# Patient Record
Sex: MALE | Race: WHITE | NOT HISPANIC OR LATINO | Employment: FULL TIME | URBAN - METROPOLITAN AREA
[De-identification: names, ages, dates, MRNs, and addresses within clinical notes are randomized per-mention and may not be internally consistent; named-entity substitution may affect disease eponyms.]

---

## 2022-07-03 ENCOUNTER — HOSPITAL ENCOUNTER (INPATIENT)
Facility: CLINIC | Age: 47
LOS: 4 days | Discharge: HOME | DRG: 418 | End: 2022-07-07
Attending: HOSPITALIST | Admitting: HOSPITALIST
Payer: COMMERCIAL

## 2022-07-03 ENCOUNTER — APPOINTMENT (OUTPATIENT)
Dept: ULTRASOUND IMAGING | Facility: CLINIC | Age: 47
End: 2022-07-03
Attending: EMERGENCY MEDICINE
Payer: COMMERCIAL

## 2022-07-03 ENCOUNTER — HOSPITAL ENCOUNTER (EMERGENCY)
Facility: CLINIC | Age: 47
Discharge: ANOTHER HOSPITAL | End: 2022-07-03
Attending: EMERGENCY MEDICINE | Admitting: EMERGENCY MEDICINE
Payer: COMMERCIAL

## 2022-07-03 VITALS
RESPIRATION RATE: 18 BRPM | BODY MASS INDEX: 44.1 KG/M2 | SYSTOLIC BLOOD PRESSURE: 109 MMHG | OXYGEN SATURATION: 97 % | DIASTOLIC BLOOD PRESSURE: 77 MMHG | HEART RATE: 85 BPM | WEIGHT: 315 LBS | HEIGHT: 71 IN | TEMPERATURE: 97.6 F

## 2022-07-03 DIAGNOSIS — G89.18 ACUTE POST-OPERATIVE PAIN: ICD-10-CM

## 2022-07-03 DIAGNOSIS — R79.89 ELEVATED LIVER FUNCTION TESTS: Primary | ICD-10-CM

## 2022-07-03 DIAGNOSIS — K81.9 CHOLECYSTITIS: ICD-10-CM

## 2022-07-03 LAB
ALBUMIN SERPL-MCNC: 3 G/DL (ref 3.4–5)
ALBUMIN UR-MCNC: 30 MG/DL
ALP SERPL-CCNC: 314 U/L (ref 40–150)
ALT SERPL W P-5'-P-CCNC: 159 U/L (ref 0–70)
ANION GAP SERPL CALCULATED.3IONS-SCNC: 10 MMOL/L (ref 3–14)
ANION GAP SERPL CALCULATED.3IONS-SCNC: 12 MMOL/L (ref 3–14)
APPEARANCE UR: ABNORMAL
AST SERPL W P-5'-P-CCNC: 107 U/L (ref 0–45)
BACTERIA #/AREA URNS HPF: ABNORMAL /HPF
BASOPHILS # BLD AUTO: 0 10E3/UL (ref 0–0.2)
BASOPHILS NFR BLD AUTO: 0 %
BILIRUB DIRECT SERPL-MCNC: 5.3 MG/DL (ref 0–0.2)
BILIRUB SERPL-MCNC: 7.3 MG/DL (ref 0.2–1.3)
BILIRUB UR QL STRIP: ABNORMAL
BUN SERPL-MCNC: 20 MG/DL (ref 7–30)
BUN SERPL-MCNC: 23 MG/DL (ref 7–30)
CALCIUM SERPL-MCNC: 9.1 MG/DL (ref 8.5–10.1)
CALCIUM SERPL-MCNC: 9.9 MG/DL (ref 8.5–10.1)
CHLORIDE BLD-SCNC: 104 MMOL/L (ref 94–109)
CHLORIDE BLD-SCNC: 95 MMOL/L (ref 94–109)
CO2 SERPL-SCNC: 23 MMOL/L (ref 20–32)
CO2 SERPL-SCNC: 25 MMOL/L (ref 20–32)
COLOR UR AUTO: ABNORMAL
CREAT SERPL-MCNC: 1.02 MG/DL (ref 0.66–1.25)
CREAT SERPL-MCNC: 1.47 MG/DL (ref 0.66–1.25)
EOSINOPHIL # BLD AUTO: 0.2 10E3/UL (ref 0–0.7)
EOSINOPHIL NFR BLD AUTO: 2 %
ERYTHROCYTE [DISTWIDTH] IN BLOOD BY AUTOMATED COUNT: 12.9 % (ref 10–15)
FLUAV RNA SPEC QL NAA+PROBE: NEGATIVE
FLUBV RNA RESP QL NAA+PROBE: NEGATIVE
GFR SERPL CREATININE-BSD FRML MDRD: 58 ML/MIN/1.73M2
GFR SERPL CREATININE-BSD FRML MDRD: 90 ML/MIN/1.73M2
GLUCOSE BLD-MCNC: 163 MG/DL (ref 70–99)
GLUCOSE BLD-MCNC: 215 MG/DL (ref 70–99)
GLUCOSE UR STRIP-MCNC: 50 MG/DL
HCT VFR BLD AUTO: 42.8 % (ref 40–53)
HGB BLD-MCNC: 15.3 G/DL (ref 13.3–17.7)
HGB UR QL STRIP: ABNORMAL
HYALINE CASTS: 7 /LPF
IMM GRANULOCYTES # BLD: 0 10E3/UL
IMM GRANULOCYTES NFR BLD: 1 %
INR PPP: 1.23 (ref 0.85–1.15)
KETONES UR STRIP-MCNC: NEGATIVE MG/DL
LACTATE SERPL-SCNC: 1.5 MMOL/L (ref 0.7–2)
LEUKOCYTE ESTERASE UR QL STRIP: NEGATIVE
LIPASE SERPL-CCNC: 103 U/L (ref 73–393)
LYMPHOCYTES # BLD AUTO: 0.8 10E3/UL (ref 0.8–5.3)
LYMPHOCYTES NFR BLD AUTO: 10 %
MCH RBC QN AUTO: 28.5 PG (ref 26.5–33)
MCHC RBC AUTO-ENTMCNC: 35.7 G/DL (ref 31.5–36.5)
MCV RBC AUTO: 80 FL (ref 78–100)
MONOCYTES # BLD AUTO: 1.1 10E3/UL (ref 0–1.3)
MONOCYTES NFR BLD AUTO: 13 %
MUCOUS THREADS #/AREA URNS LPF: PRESENT /LPF
NEUTROPHILS # BLD AUTO: 6 10E3/UL (ref 1.6–8.3)
NEUTROPHILS NFR BLD AUTO: 74 %
NITRATE UR QL: NEGATIVE
NRBC # BLD AUTO: 0 10E3/UL
NRBC BLD AUTO-RTO: 0 /100
PH UR STRIP: 5 [PH] (ref 5–7)
PLATELET # BLD AUTO: 234 10E3/UL (ref 150–450)
POTASSIUM BLD-SCNC: 3.1 MMOL/L (ref 3.4–5.3)
POTASSIUM BLD-SCNC: 3.1 MMOL/L (ref 3.4–5.3)
PROT SERPL-MCNC: 7.6 G/DL (ref 6.8–8.8)
RBC # BLD AUTO: 5.36 10E6/UL (ref 4.4–5.9)
RBC URINE: 1 /HPF
RENAL TUB EPI: 2 /HPF
SARS-COV-2 RNA RESP QL NAA+PROBE: NEGATIVE
SODIUM SERPL-SCNC: 132 MMOL/L (ref 133–144)
SODIUM SERPL-SCNC: 137 MMOL/L (ref 133–144)
SP GR UR STRIP: 1.02 (ref 1–1.03)
SQUAMOUS EPITHELIAL: 1 /HPF
TROPONIN I SERPL HS-MCNC: 16 NG/L
UROBILINOGEN UR STRIP-MCNC: 4 MG/DL
WBC # BLD AUTO: 8.2 10E3/UL (ref 4–11)
WBC CAST: 14 /LPF
WBC URINE: 67 /HPF

## 2022-07-03 PROCEDURE — 83690 ASSAY OF LIPASE: CPT | Performed by: EMERGENCY MEDICINE

## 2022-07-03 PROCEDURE — 258N000003 HC RX IP 258 OP 636: Performed by: EMERGENCY MEDICINE

## 2022-07-03 PROCEDURE — 250N000011 HC RX IP 250 OP 636: Performed by: HOSPITALIST

## 2022-07-03 PROCEDURE — 87636 SARSCOV2 & INF A&B AMP PRB: CPT | Performed by: EMERGENCY MEDICINE

## 2022-07-03 PROCEDURE — 87086 URINE CULTURE/COLONY COUNT: CPT | Performed by: EMERGENCY MEDICINE

## 2022-07-03 PROCEDURE — 85025 COMPLETE CBC W/AUTO DIFF WBC: CPT | Performed by: EMERGENCY MEDICINE

## 2022-07-03 PROCEDURE — 99223 1ST HOSP IP/OBS HIGH 75: CPT | Mod: AI | Performed by: HOSPITALIST

## 2022-07-03 PROCEDURE — 84484 ASSAY OF TROPONIN QUANT: CPT | Performed by: EMERGENCY MEDICINE

## 2022-07-03 PROCEDURE — 93005 ELECTROCARDIOGRAM TRACING: CPT | Performed by: EMERGENCY MEDICINE

## 2022-07-03 PROCEDURE — 120N000001 HC R&B MED SURG/OB

## 2022-07-03 PROCEDURE — 99285 EMERGENCY DEPT VISIT HI MDM: CPT | Mod: 25 | Performed by: EMERGENCY MEDICINE

## 2022-07-03 PROCEDURE — 76705 ECHO EXAM OF ABDOMEN: CPT

## 2022-07-03 PROCEDURE — 96361 HYDRATE IV INFUSION ADD-ON: CPT | Performed by: EMERGENCY MEDICINE

## 2022-07-03 PROCEDURE — 83605 ASSAY OF LACTIC ACID: CPT | Performed by: EMERGENCY MEDICINE

## 2022-07-03 PROCEDURE — 258N000003 HC RX IP 258 OP 636: Performed by: HOSPITALIST

## 2022-07-03 PROCEDURE — C9803 HOPD COVID-19 SPEC COLLECT: HCPCS | Performed by: EMERGENCY MEDICINE

## 2022-07-03 PROCEDURE — 96375 TX/PRO/DX INJ NEW DRUG ADDON: CPT | Performed by: EMERGENCY MEDICINE

## 2022-07-03 PROCEDURE — 81001 URINALYSIS AUTO W/SCOPE: CPT | Performed by: EMERGENCY MEDICINE

## 2022-07-03 PROCEDURE — C9113 INJ PANTOPRAZOLE SODIUM, VIA: HCPCS | Performed by: EMERGENCY MEDICINE

## 2022-07-03 PROCEDURE — 250N000009 HC RX 250: Performed by: EMERGENCY MEDICINE

## 2022-07-03 PROCEDURE — 36415 COLL VENOUS BLD VENIPUNCTURE: CPT | Performed by: EMERGENCY MEDICINE

## 2022-07-03 PROCEDURE — 250N000013 HC RX MED GY IP 250 OP 250 PS 637: Performed by: EMERGENCY MEDICINE

## 2022-07-03 PROCEDURE — 36415 COLL VENOUS BLD VENIPUNCTURE: CPT | Performed by: HOSPITALIST

## 2022-07-03 PROCEDURE — 82248 BILIRUBIN DIRECT: CPT | Performed by: EMERGENCY MEDICINE

## 2022-07-03 PROCEDURE — 85610 PROTHROMBIN TIME: CPT | Performed by: EMERGENCY MEDICINE

## 2022-07-03 PROCEDURE — 96365 THER/PROPH/DIAG IV INF INIT: CPT | Performed by: EMERGENCY MEDICINE

## 2022-07-03 PROCEDURE — 250N000011 HC RX IP 250 OP 636: Performed by: EMERGENCY MEDICINE

## 2022-07-03 PROCEDURE — 82310 ASSAY OF CALCIUM: CPT | Performed by: HOSPITALIST

## 2022-07-03 PROCEDURE — 93010 ELECTROCARDIOGRAM REPORT: CPT | Performed by: EMERGENCY MEDICINE

## 2022-07-03 PROCEDURE — 80053 COMPREHEN METABOLIC PANEL: CPT | Performed by: EMERGENCY MEDICINE

## 2022-07-03 RX ORDER — HYDROMORPHONE HCL IN WATER/PF 6 MG/30 ML
0.2 PATIENT CONTROLLED ANALGESIA SYRINGE INTRAVENOUS
Status: DISCONTINUED | OUTPATIENT
Start: 2022-07-03 | End: 2022-07-06

## 2022-07-03 RX ORDER — KETOROLAC TROMETHAMINE 15 MG/ML
15 INJECTION, SOLUTION INTRAMUSCULAR; INTRAVENOUS ONCE
Status: COMPLETED | OUTPATIENT
Start: 2022-07-03 | End: 2022-07-03

## 2022-07-03 RX ORDER — NALOXONE HYDROCHLORIDE 0.4 MG/ML
0.2 INJECTION, SOLUTION INTRAMUSCULAR; INTRAVENOUS; SUBCUTANEOUS
Status: DISCONTINUED | OUTPATIENT
Start: 2022-07-03 | End: 2022-07-07 | Stop reason: HOSPADM

## 2022-07-03 RX ORDER — ONDANSETRON 2 MG/ML
4 INJECTION INTRAMUSCULAR; INTRAVENOUS EVERY 30 MIN PRN
Status: DISCONTINUED | OUTPATIENT
Start: 2022-07-03 | End: 2022-07-03 | Stop reason: HOSPADM

## 2022-07-03 RX ORDER — ONDANSETRON 2 MG/ML
4 INJECTION INTRAMUSCULAR; INTRAVENOUS EVERY 6 HOURS PRN
Status: DISCONTINUED | OUTPATIENT
Start: 2022-07-03 | End: 2022-07-07 | Stop reason: HOSPADM

## 2022-07-03 RX ORDER — ONDANSETRON 4 MG/1
4 TABLET, ORALLY DISINTEGRATING ORAL EVERY 6 HOURS PRN
Status: DISCONTINUED | OUTPATIENT
Start: 2022-07-03 | End: 2022-07-07 | Stop reason: HOSPADM

## 2022-07-03 RX ORDER — SODIUM CHLORIDE 9 MG/ML
INJECTION, SOLUTION INTRAVENOUS CONTINUOUS
Status: DISCONTINUED | OUTPATIENT
Start: 2022-07-03 | End: 2022-07-07 | Stop reason: HOSPADM

## 2022-07-03 RX ORDER — AMPICILLIN AND SULBACTAM 2; 1 G/1; G/1
3 INJECTION, POWDER, FOR SOLUTION INTRAMUSCULAR; INTRAVENOUS ONCE
Status: COMPLETED | OUTPATIENT
Start: 2022-07-03 | End: 2022-07-03

## 2022-07-03 RX ORDER — LIDOCAINE 40 MG/G
CREAM TOPICAL
Status: DISCONTINUED | OUTPATIENT
Start: 2022-07-03 | End: 2022-07-07 | Stop reason: HOSPADM

## 2022-07-03 RX ORDER — SODIUM CHLORIDE 9 MG/ML
INJECTION, SOLUTION INTRAVENOUS CONTINUOUS
Status: DISCONTINUED | OUTPATIENT
Start: 2022-07-03 | End: 2022-07-03 | Stop reason: HOSPADM

## 2022-07-03 RX ORDER — NALOXONE HYDROCHLORIDE 0.4 MG/ML
0.4 INJECTION, SOLUTION INTRAMUSCULAR; INTRAVENOUS; SUBCUTANEOUS
Status: DISCONTINUED | OUTPATIENT
Start: 2022-07-03 | End: 2022-07-07 | Stop reason: HOSPADM

## 2022-07-03 RX ORDER — POTASSIUM CHLORIDE 7.45 MG/ML
10 INJECTION INTRAVENOUS
Status: DISCONTINUED | OUTPATIENT
Start: 2022-07-03 | End: 2022-07-04

## 2022-07-03 RX ORDER — PIPERACILLIN SODIUM, TAZOBACTAM SODIUM 4; .5 G/20ML; G/20ML
4.5 INJECTION, POWDER, LYOPHILIZED, FOR SOLUTION INTRAVENOUS EVERY 6 HOURS
Status: DISCONTINUED | OUTPATIENT
Start: 2022-07-03 | End: 2022-07-07 | Stop reason: HOSPADM

## 2022-07-03 RX ADMIN — PANTOPRAZOLE SODIUM 40 MG: 40 INJECTION, POWDER, FOR SOLUTION INTRAVENOUS at 15:50

## 2022-07-03 RX ADMIN — POTASSIUM CHLORIDE 10 MEQ: 7.46 INJECTION, SOLUTION INTRAVENOUS at 23:01

## 2022-07-03 RX ADMIN — KETOROLAC TROMETHAMINE 15 MG: 15 INJECTION, SOLUTION INTRAMUSCULAR; INTRAVENOUS at 11:47

## 2022-07-03 RX ADMIN — PIPERACILLIN AND TAZOBACTAM 4.5 G: 4; .5 INJECTION, POWDER, FOR SOLUTION INTRAVENOUS at 22:22

## 2022-07-03 RX ADMIN — SODIUM CHLORIDE 1000 ML: 9 INJECTION, SOLUTION INTRAVENOUS at 11:44

## 2022-07-03 RX ADMIN — SODIUM CHLORIDE 1000 ML: 9 INJECTION, SOLUTION INTRAVENOUS at 12:57

## 2022-07-03 RX ADMIN — ONDANSETRON 4 MG: 2 INJECTION INTRAMUSCULAR; INTRAVENOUS at 11:45

## 2022-07-03 RX ADMIN — SODIUM CHLORIDE 1000 ML: 9 INJECTION, SOLUTION INTRAVENOUS at 17:09

## 2022-07-03 RX ADMIN — SODIUM CHLORIDE: 9 INJECTION, SOLUTION INTRAVENOUS at 22:13

## 2022-07-03 RX ADMIN — LIDOCAINE HYDROCHLORIDE 30 ML: 20 SOLUTION ORAL; TOPICAL at 15:55

## 2022-07-03 RX ADMIN — AMPICILLIN SODIUM AND SULBACTAM SODIUM 3 G: 2; 1 INJECTION, POWDER, FOR SOLUTION INTRAMUSCULAR; INTRAVENOUS at 12:55

## 2022-07-03 ASSESSMENT — ACTIVITIES OF DAILY LIVING (ADL): ADLS_ACUITY_SCORE: 37

## 2022-07-03 NOTE — ED TRIAGE NOTES
Reports N/V/D earlier this week, now states he has general weakness and dizziness.      Triage Assessment     Row Name 07/03/22 1105       Triage Assessment (Adult)    Airway WDL WDL       Respiratory WDL    Respiratory WDL WDL       Skin Circulation/Temperature WDL    Skin Circulation/Temperature WDL WDL

## 2022-07-03 NOTE — ED PROVIDER NOTES
"  History     Chief Complaint   Patient presents with     Generalized Weakness     HPI  Jair Anderson is a 47 year old male who presents with 3 days of \"24-hour GI bug\".  Patient stated he had 12 emesis in the first 24 hours but improved since then.  He has had mucus/diarrhea but nonbloody stools.  Generally weak with lightheadedness.  Denies congestion or sore throat but has had a change in his taste.  Denies productive cough or chest pain but states it hurts to take a deep breath.  He admits that his whole body hurts.  No history of DVT or PE.  Strong family history of cardiac disease on both sides of family but patient has had no cardiac problems.  He does have hypertension and states that his blood pressure today is low compared to his baseline.  Denies urinary symptoms.  He has had no abdominal surgeries.  Patient's mother had biliary disease and cholecystectomy.  Has not had a colonoscopy.  No leg pain or swelling.  No exposure to infectious GI illness.  No other family members is eaten similar foods.  Patient had negative COVID test at home.    Allergies:  Allergies   Allergen Reactions     Alcohol        Problem List:    There are no problems to display for this patient.       Past Medical History:    History reviewed. No pertinent past medical history.    Past Surgical History:    History reviewed. No pertinent surgical history.    Family History:    History reviewed. No pertinent family history.    Social History:  Marital Status:   [2]  Social History     Tobacco Use     Smoking status: Never Smoker     Smokeless tobacco: Never Used   Substance Use Topics     Alcohol use: Never     Drug use: Never        Medications:    No current outpatient medications on file.        Review of Systems all other systems are reviewed and are negative.    Physical Exam   BP: 98/66  Pulse: 68  Temp: 97.6  F (36.4  C)  Resp: 18  Height: 180.3 cm (5' 11\")  Weight: (!) 156 kg (344 lb)  SpO2: 94 %      Physical " Exam alert male who looks sallow.  If he has subtle scleral icterus.  Nasal mucosal swelling.  Speech is clear.  Mucosa is moist.  Neck is supple.  Lungs were clear.  Normal cardiac auscultation.  No CVA tenderness.  Obese abdomen with active bowel sounds.  Tender in the right upper quadrant with some voluntary guarding but no rebound.  No organomegaly but his body habitus makes this a difficult exam.  No skin rash over the flank or abdomen.  No leg pain or swelling.    ED Course       Patient is not suicidal or homicidal.       Procedures              EKG Interpretation:      Interpreted by Jorge Tellez MD  Time reviewed:11:30  Symptoms at time of EKG: Generalized weakness  Rhythm: Normal sinus   Rate: Normal  Axis: Normal  Ectopy: None  Conduction: Normal  ST Segments/ T Waves: No ST-T wave changes and No acute ischemic changes  Q Waves: None  Comparison to prior: No old EKG available    Clinical Impression: Normal sinus EKG with nonspecific ST wave changes      Critical Care time:  none               Results for orders placed or performed during the hospital encounter of 07/03/22 (from the past 24 hour(s))   Symptomatic; Auto-generated order Influenza A/B & SARS-CoV2 (COVID-19) Virus PCR Multiplex Nasopharyngeal    Specimen: Nasopharyngeal; Swab   Result Value Ref Range    Influenza A PCR Negative Negative    Influenza B PCR Negative Negative    SARS CoV2 PCR Negative Negative    Narrative    Testing was performed using the shari SARS-CoV-2 & Influenza A/B Assay on the shari [employer] System. This test should be ordered for the detection of SARS-CoV-2 and influenza viruses in individuals who meet clinical and/or epidemiological criteria. Test performance is unknown in asymptomatic patients. This test is for in vitro diagnostic use under the FDA EUA for laboratories certified under CLIA to perform moderate and/or high complexity testing. This test has not been FDA cleared or approved. A negative result does not rule  out the presence of PCR inhibitors in the specimen or target RNA in concentration below the limit of detection for the assay. If only one viral target is positive but coinfection with multiple targets is suspected, the sample should be re-tested with another FDA cleared, approved or authorized test, if coinfection would change clinical management. [facility] are certified under the Clinical Laboratory Improvement Amendments of 1988 (CLIA-88) as  qualified to perform moderate and/or high complexity laboratory testing.   CBC with platelets differential    Narrative    The following orders were created for panel order CBC with platelets differential.  Procedure                               Abnormality         Status                     ---------                               -----------         ------                     CBC with platelets and d...[[id number]]                      Final result                 Please view results for these tests on the individual orders.   INR   Result Value Ref Range    INR 1.23 (H) 0.85 - 1.15   Comprehensive metabolic panel   Result Value Ref Range    Sodium 132 (L) 133 - 144 mmol/L    Potassium 3.1 (L) 3.4 - 5.3 mmol/L    Chloride 95 94 - 109 mmol/L    Carbon Dioxide (CO2) 25 20 - 32 mmol/L    Anion Gap 12 3 - 14 mmol/L    Urea Nitrogen 23 7 - 30 mg/dL    Creatinine 1.47 (H) 0.66 - 1.25 mg/dL    Calcium 9.9 8.5 - 10.1 mg/dL    Glucose 215 (H) 70 - 99 mg/dL    Alkaline Phosphatase 314 (H) 40 - 150 U/L     (H) 0 - 45 U/L     (H) 0 - 70 U/L    Protein Total 7.6 6.8 - 8.8 g/dL    Albumin 3.0 (L) 3.4 - 5.0 g/dL    Bilirubin Total 7.3 (H) 0.2 - 1.3 mg/dL    GFR Estimate 58 (L) >60 mL/min/1.73m2   Lactic acid whole blood   Result Value Ref Range    Lactic Acid 1.5 0.7 - 2.0 mmol/L   Troponin I   Result Value Ref Range    Troponin I High Sensitivity 16 <79 ng/L   CBC with platelets and differential   Result Value Ref Range    WBC Count 8.2 4.0 - 11.0  10e3/uL    RBC Count 5.36 4.40 - 5.90 10e6/uL    Hemoglobin 15.3 13.3 - 17.7 g/dL    Hematocrit 42.8 40.0 - 53.0 %    MCV 80 78 - 100 fL    MCH 28.5 26.5 - 33.0 pg    MCHC 35.7 31.5 - 36.5 g/dL    RDW 12.9 10.0 - 15.0 %    Platelet Count 234 150 - 450 10e3/uL    % Neutrophils 74 %    % Lymphocytes 10 %    % Monocytes 13 %    % Eosinophils 2 %    % Basophils 0 %    % Immature Granulocytes 1 %    NRBCs per 100 WBC 0 <1 /100    Absolute Neutrophils 6.0 1.6 - 8.3 10e3/uL    Absolute Lymphocytes 0.8 0.8 - 5.3 10e3/uL    Absolute Monocytes 1.1 0.0 - 1.3 10e3/uL    Absolute Eosinophils 0.2 0.0 - 0.7 10e3/uL    Absolute Basophils 0.0 0.0 - 0.2 10e3/uL    Absolute Immature Granulocytes 0.0 <=0.4 10e3/uL    Absolute NRBCs 0.0 10e3/uL   Lipase   Result Value Ref Range    Lipase 103 73 - 393 U/L   Bilirubin direct   Result Value Ref Range    Bilirubin Direct 5.3 (H) 0.0 - 0.2 mg/dL   UA with Microscopic reflex to Culture    Specimen: Urine, NOS   Result Value Ref Range    Color Urine Nivia (A) Colorless, Straw, Light Yellow, Yellow    Appearance Urine Cloudy (A) Clear    Glucose Urine 50  (A) Negative mg/dL    Bilirubin Urine Small (A) Negative    Ketones Urine Negative Negative mg/dL    Specific Gravity Urine 1.016 1.003 - 1.035    Blood Urine Small (A) Negative    pH Urine 5.0 5.0 - 7.0    Protein Albumin Urine 30  (A) Negative mg/dL    Urobilinogen Urine 4.0 (A) Normal, 2.0 mg/dL    Nitrite Urine Negative Negative    Leukocyte Esterase Urine Negative Negative    Bacteria Urine Few (A) None Seen /HPF    Mucus Urine Present (A) None Seen /LPF    RBC Urine 1 <=2 /HPF    WBC Urine 67 (H) <=5 /HPF    Squamous Epithelials Urine 1 <=1 /HPF    Renal Tubular Epithelials Urine 2 (H) None Seen /HPF    Hyaline Casts Urine 7 (H) <=2 /LPF    WBC Casts Urine 14 (H) None Seen /LPF    Narrative    Urine Culture ordered based on laboratory criteria   US Abdomen Limited    Narrative    EXAM: US ABDOMEN LIMITED  LOCATION: [facility]  DATE/TIME: 7/3/2022 1:50 PM    INDICATION: Right upper quadrant pain with concerns for cholecystitis  COMPARISON: None.  TECHNIQUE: Limited abdominal ultrasound.    FINDINGS:    GALLBLADDER: Distended. There is dependent sludge and few tiny gallstones. There is mild gallbladder wall edema with the wall measuring 4 mm. There is a sonographic Odonnell's sign.    BILE DUCTS: No biliary dilatation. The common duct measures 6 mm.    LIVER: Diffuse fatty infiltration of the liver. No focal mass.    RIGHT KIDNEY: No hydronephrosis.    PANCREAS: Obscured by bowel gas.    No ascites.      Impression    IMPRESSION:  1.  Ultrasound findings of an early cholecystitis.  2.  There are a few tiny gallstones and sludge as well.           Medications   0.9% sodium chloride BOLUS (0 mLs Intravenous Stopped 7/3/22 1257)     Followed by   0.9% sodium chloride BOLUS (0 mLs Intravenous Stopped 7/3/22 1410)     Followed by   sodium chloride 0.9% infusion (has no administration in time range)   ondansetron (ZOFRAN) injection 4 mg (4 mg Intravenous Given 7/3/22 1145)   0.9% sodium chloride BOLUS (1,000 mLs Intravenous New Bag 7/3/22 1709)     Followed by   sodium chloride 0.9% infusion (has no administration in time range)   ketorolac (TORADOL) injection 15 mg (15 mg Intravenous Given 7/3/22 1147)   ampicillin-sulbactam (UNASYN) 3 g vial to attach to  mL bag (0 g Intravenous Stopped 7/3/22 1334)   pantoprazole (PROTONIX) IV push injection 40 mg (40 mg Intravenous Given 7/3/22 1550)   lidocaine (viscous) (XYLOCAINE) 2 % 15 mL, alum & mag hydroxide-simethicone (MAALOX) 15 mL GI Cocktail (30 mLs Oral Given 7/3/22 1555)     On recheck at 150.  Patient's pain is down to a 3-4 to 110 scale.  Defer stronger pain meds.  Nausea is resolved.  Still feels weak and lightheaded.  Additional IV fluids are ordered.  His LFTs and bili remain elevated concerning for cholecystitis so ultrasound is ordered.    On recheck  "patient's pain is adequately controlled.  He states he has bad reflux.  IV Protonix and a GI cocktail ordered.  Patient's bilirubin was quite high at 7.3 so this was fractionated and unfortunately the direct is still elevated 5.3  Unfortunately have no availability for MRCP today and do not have GI that can perform ERCP.   recommended transfer where either these procedures could be performed to further assess.  At 4:45 PM on recheck patient is resting.  No bed availability at [place].  Looking through [employer] [facility] [employer] systems for bed availability.  After 2 L of fluids patient did provide a dark urine specimen.  Additional boluses ordered.  Assessments & Plan (with Medical Decision Making)   Jair Anderson is a 47 year old male who presents with 3 days of \"24-hour GI bug\".  Patient stated he had 12 emesis in the first 24 hours but improved since then.  He has had mucus/diarrhea but nonbloody stools.  Generally weak with lightheadedness.  Denies congestion or sore throat but has had a change in his taste.  Denies productive cough or chest pain but states it hurts to take a deep breath.  He admits that his whole body hurts.  No history of DVT or PE.  Strong family history of cardiac disease on both sides of family but patient has had no cardiac problems.  He does have hypertension and states that his blood pressure today is low compared to his baseline.  Denies urinary symptoms.  He has had no abdominal surgeries.  Has not had a colonoscopy.  No leg pain or swelling.  No exposure to infectious GI illness.  No other family members is eaten similar foods.  Patient's mother had biliary disease and cholecystectomy.  Patient had negative COVID test at home.  On presentation patient was afebrile and not hypoxic.  He was not tachycardic but his blood pressure was low from his baseline at 98 systolically.  Question subtle scleral icterus.  Appeared sallow.  Lungs were clear.  Cardiac auscultation " was normal.  No CVA tenderness.  Obese abdomen with right upper quadrant tenderness.  No guarding or rebound.  No organomegaly but body habitus made this difficult exam.  No leg pain or swelling.  EKG did not show any acute ischemic changes.  Patient's blood work showed a normal white count and lactic acid.  His bilirubin and transaminases however were elevated.  Normal lactic acid.  Concerns for cholecystitis so limited ultrasound is ordered to further assess and this did show a positive Odonnell sign, thickened gallbladder wall, and small stones and sludge in.  Unfortunately with the elevated bilirubin with fractionated number of 5.3 for direct concerns for distal stone.  No availability for MRCP or ERCP at our facility.  I spoke to Dr. Fleming from [facility] and they are willing to accept the patient in transfer.  Apparently they contacted GI and they were willing to do an ERCP on this patient.  I have reviewed the nursing notes.    I have reviewed the findings, diagnosis, plan and need for follow up with the patient.       New Prescriptions    No medications on file       Final diagnoses:   Cholecystitis       7/3/2022   [facility] EMERGENCY DEPT     Jorge Tellez MD  07/03/22 1580

## 2022-07-04 LAB
ALBUMIN SERPL-MCNC: 2.4 G/DL (ref 3.4–5)
ALP SERPL-CCNC: 258 U/L (ref 40–150)
ALT SERPL W P-5'-P-CCNC: 117 U/L (ref 0–70)
ANION GAP SERPL CALCULATED.3IONS-SCNC: 8 MMOL/L (ref 3–14)
AST SERPL W P-5'-P-CCNC: 71 U/L (ref 0–45)
BILIRUB DIRECT SERPL-MCNC: 2.3 MG/DL (ref 0–0.2)
BILIRUB SERPL-MCNC: 3.5 MG/DL (ref 0.2–1.3)
BUN SERPL-MCNC: 18 MG/DL (ref 7–30)
CALCIUM SERPL-MCNC: 8.8 MG/DL (ref 8.5–10.1)
CHLORIDE BLD-SCNC: 106 MMOL/L (ref 94–109)
CO2 SERPL-SCNC: 24 MMOL/L (ref 20–32)
CREAT SERPL-MCNC: 1.05 MG/DL (ref 0.66–1.25)
ERYTHROCYTE [DISTWIDTH] IN BLOOD BY AUTOMATED COUNT: 13 % (ref 10–15)
GFR SERPL CREATININE-BSD FRML MDRD: 87 ML/MIN/1.73M2
GLUCOSE BLD-MCNC: 164 MG/DL (ref 70–99)
HBA1C MFR BLD: 6.9 % (ref 0–5.6)
HCT VFR BLD AUTO: 36.7 % (ref 40–53)
HGB BLD-MCNC: 12.7 G/DL (ref 13.3–17.7)
MCH RBC QN AUTO: 28 PG (ref 26.5–33)
MCHC RBC AUTO-ENTMCNC: 34.6 G/DL (ref 31.5–36.5)
MCV RBC AUTO: 81 FL (ref 78–100)
PLATELET # BLD AUTO: 190 10E3/UL (ref 150–450)
POTASSIUM BLD-SCNC: 3.6 MMOL/L (ref 3.4–5.3)
POTASSIUM BLD-SCNC: 3.6 MMOL/L (ref 3.4–5.3)
PROT SERPL-MCNC: 6.5 G/DL (ref 6.8–8.8)
RBC # BLD AUTO: 4.53 10E6/UL (ref 4.4–5.9)
SODIUM SERPL-SCNC: 138 MMOL/L (ref 133–144)
WBC # BLD AUTO: 5.7 10E3/UL (ref 4–11)

## 2022-07-04 PROCEDURE — 84132 ASSAY OF SERUM POTASSIUM: CPT | Performed by: HOSPITALIST

## 2022-07-04 PROCEDURE — 258N000003 HC RX IP 258 OP 636: Performed by: HOSPITALIST

## 2022-07-04 PROCEDURE — 36415 COLL VENOUS BLD VENIPUNCTURE: CPT | Performed by: HOSPITALIST

## 2022-07-04 PROCEDURE — 250N000011 HC RX IP 250 OP 636: Performed by: HOSPITALIST

## 2022-07-04 PROCEDURE — 85027 COMPLETE CBC AUTOMATED: CPT | Performed by: HOSPITALIST

## 2022-07-04 PROCEDURE — 99221 1ST HOSP IP/OBS SF/LOW 40: CPT | Performed by: SURGERY

## 2022-07-04 PROCEDURE — 83036 HEMOGLOBIN GLYCOSYLATED A1C: CPT | Performed by: HOSPITALIST

## 2022-07-04 PROCEDURE — 99233 SBSQ HOSP IP/OBS HIGH 50: CPT | Performed by: HOSPITALIST

## 2022-07-04 PROCEDURE — 82248 BILIRUBIN DIRECT: CPT | Performed by: HOSPITALIST

## 2022-07-04 PROCEDURE — 120N000001 HC R&B MED SURG/OB

## 2022-07-04 PROCEDURE — 82310 ASSAY OF CALCIUM: CPT | Performed by: HOSPITALIST

## 2022-07-04 PROCEDURE — 250N000013 HC RX MED GY IP 250 OP 250 PS 637: Performed by: HOSPITALIST

## 2022-07-04 RX ORDER — LIDOCAINE 40 MG/G
CREAM TOPICAL
Status: CANCELLED | OUTPATIENT
Start: 2022-07-04

## 2022-07-04 RX ORDER — METFORMIN HCL 500 MG
1000 TABLET, EXTENDED RELEASE 24 HR ORAL 2 TIMES DAILY WITH MEALS
COMMUNITY

## 2022-07-04 RX ORDER — ATORVASTATIN CALCIUM 40 MG/1
40 TABLET, FILM COATED ORAL DAILY
COMMUNITY

## 2022-07-04 RX ORDER — POTASSIUM CHLORIDE 1.5 G/1.58G
20 POWDER, FOR SOLUTION ORAL ONCE
Status: COMPLETED | OUTPATIENT
Start: 2022-07-04 | End: 2022-07-04

## 2022-07-04 RX ORDER — LISINOPRIL AND HYDROCHLOROTHIAZIDE 20; 25 MG/1; MG/1
1 TABLET ORAL DAILY
COMMUNITY

## 2022-07-04 RX ORDER — METOPROLOL SUCCINATE 25 MG/1
25 TABLET, EXTENDED RELEASE ORAL DAILY
COMMUNITY

## 2022-07-04 RX ORDER — CYCLOBENZAPRINE HCL 5 MG
5 TABLET ORAL 3 TIMES DAILY PRN
COMMUNITY

## 2022-07-04 RX ORDER — COLCHICINE 0.6 MG/1
0.6 TABLET ORAL 3 TIMES DAILY PRN
COMMUNITY

## 2022-07-04 RX ADMIN — SODIUM CHLORIDE: 9 INJECTION, SOLUTION INTRAVENOUS at 20:37

## 2022-07-04 RX ADMIN — POTASSIUM CHLORIDE 20 MEQ: 1.5 POWDER, FOR SOLUTION ORAL at 01:44

## 2022-07-04 RX ADMIN — PIPERACILLIN AND TAZOBACTAM 4.5 G: 4; .5 INJECTION, POWDER, FOR SOLUTION INTRAVENOUS at 09:21

## 2022-07-04 RX ADMIN — PIPERACILLIN AND TAZOBACTAM 4.5 G: 4; .5 INJECTION, POWDER, FOR SOLUTION INTRAVENOUS at 03:43

## 2022-07-04 RX ADMIN — SODIUM CHLORIDE: 9 INJECTION, SOLUTION INTRAVENOUS at 10:59

## 2022-07-04 RX ADMIN — PIPERACILLIN AND TAZOBACTAM 4.5 G: 4; .5 INJECTION, POWDER, FOR SOLUTION INTRAVENOUS at 16:07

## 2022-07-04 RX ADMIN — PIPERACILLIN AND TAZOBACTAM 4.5 G: 4; .5 INJECTION, POWDER, FOR SOLUTION INTRAVENOUS at 21:46

## 2022-07-04 ASSESSMENT — ACTIVITIES OF DAILY LIVING (ADL)
ADLS_ACUITY_SCORE: 34
ADLS_ACUITY_SCORE: 39
ADLS_ACUITY_SCORE: 34
ADLS_ACUITY_SCORE: 39
ADLS_ACUITY_SCORE: 34
ADLS_ACUITY_SCORE: 39
ADLS_ACUITY_SCORE: 34
ADLS_ACUITY_SCORE: 39
ADLS_ACUITY_SCORE: 39
ADLS_ACUITY_SCORE: 37

## 2022-07-04 NOTE — PLAN OF CARE
Goal Outcome Evaluation:    (23:00-07:00) Pt. A&O x4. VSS on RA. Up w/ A1 & GB in room. NPO. Pain upon palpation on RUQ; however refused taking pain meds. Denies n/v. Voiding adequately per urinal overnight. IVF infusing & on intermittent IV abx. K lab recheck in the AM. On tele for SR. CMS intact. GI following. General surgery consulted. Will cont' to monitor.

## 2022-07-04 NOTE — CONSULTS
Consult Date: 07/04/2022    GASTROINTESTINAL CONSULTATION    REFERRING PHYSICIAN:  Sudeep Ghotra MD    HISTORY OF PRESENT ILLNESS:  Meek Anderson is a 47-year-old male who was admitted to [facility] yesterday with abdominal pain, nausea, vomiting, diarrhea, gallstones on ultrasound, and elevated LFTs.  The patient says he had a GI bug for 2-3 days prior to admission, initially with vomiting, then with diarrhea and some diffuse abdominal pain as well as body aches and lightheadedness.  He came to the ER and was found to have elevated LFTs with an alkaline phosphatase of 314, ALT of 159, ALT of 107, total bilirubin of 7.3.  Ultrasound revealed some small stones and sludge.    The patient is actually feeling quite a bit better at this time with no abdominal pain, no nausea or vomiting, and no diarrhea.  He says he is hungry and wants to eat and had a normal formed stool today.  He has no heartburn or regurgitation.  No dysphagia or odynophagia.  In general, his appetite has been good and his weight has been stable.    Review of systems, medications, and allergies are all as noted per the history and physical of Salma Fleming MD dated 07/03/2022 and will not be recapitulated at this time.    PHYSICAL EXAMINATION:    GENERAL:  Physical exam reveals an obese male in no acute distress.  VITAL SIGNS:  Blood pressure is 110/70, pulse 80 and regular, respirations 15 per minute.  HEAD AND NECK:  Normocephalic and atraumatic.  Sclerae white.  Conjunctivae pink.  Nose and throat clear.  NECK:  Supple without JVD or thyromegaly.  CHEST:  Clear to auscultation and percussion.  HEART:  S1, S2 normal.  No S3, S4, or murmurs.  ABDOMEN:  Soft, nontender abdomen.  No hepatosplenomegaly or masses.  EXTREMITIES:  No clubbing, cyanosis or edema.  SKIN:  No skin rash.  NEUROLOGIC:  No focal neurological findings.  RECTAL:  Examination deferred.    LABORATORY DATA:  As noted per the chart.    IMPRESSION:  Acute  cholecystitis:  This patient appears to have suffered an attack of acute cholecystitis, now resolved.  He does have elevated LFTs, suggesting he may have an obstructing common bile duct stone, although on ultrasound, the common bile duct is only 6 mm in diameter, so I suspect this patient may have had a stone that passed.    PLAN:  The plan will be for endoscopic ultrasound and possible ERCP/sphincterotomy tomorrow, followed by a cholecystectomy.    Shawn Larry MD        D: 2022   T: 2022   MT: MURRAY    Name:     RIVER HOSKINS  MRN:      [id number]        Account:      [id number]   :      1975           Consult Date: 2022     Document: [id number]

## 2022-07-04 NOTE — PHARMACY-ADMISSION MEDICATION HISTORY
Pharmacy Medication History  Admission medication history interview status for the 7/3/2022  admission is complete. See EPIC admission navigator for prior to admission medications     Location of Interview: Patient room  Medication history sources: Patient and Surescripts    Significant changes made to the medication list:      In the past week, patient estimated taking medication this percent of the time: 50-90% due to illness    Additional medication history information:       Medication reconciliation completed by provider prior to medication history? No    Time spent in this activity: 15min     Prior to Admission medications    Medication Sig Last Dose Taking? Auth Provider Long Term End Date   atorvastatin (LIPITOR) 40 MG tablet Take 40 mg by mouth daily  Yes Unknown, Entered By History Yes    colchicine (COLCYRS) 0.6 MG tablet Take 0.6 mg by mouth 3 times daily as needed for moderate pain  Yes Unknown, Entered By History     cyclobenzaprine (FLEXERIL) 5 MG tablet Take 5 mg by mouth 3 times daily as needed for muscle spasms  Yes Unknown, Entered By History     dulaglutide (TRULICITY) 1.5 MG/0.5ML pen Inject 1.5 mg Subcutaneous every 7 days  Yes Unknown, Entered By History     lisinopril-hydrochlorothiazide (ZESTORETIC) 20-25 MG tablet Take 1 tablet by mouth daily  Yes Unknown, Entered By History Yes    metFORMIN (GLUCOPHAGE XR) 500 MG 24 hr tablet Take 1,000 mg by mouth 2 times daily (with meals)  Yes Unknown, Entered By History Yes    metoprolol succinate ER (TOPROL XL) 25 MG 24 hr tablet Take 25 mg by mouth daily  Yes Unknown, Entered By History Yes        The information provided in this note is only as accurate as the sources available at the time of update(s)   Angelica SinghD

## 2022-07-04 NOTE — CONSULTS
General Surgery Consultation      Meek Anderson MRN# [id number]   YOB: 1975 Age: 47 year old   Date of Admission: 7/3/2022     Reason for consult: I was asked by Dr. Fleming to evaluate this patient for complicated gallstones disease.           Assessment and Plan:   Complicated gallstones disease.  With elevated total and direct bilirubin's.  Patient was transferred to [facility] for potential need for advanced endoscopic treatment.  His abdominal complaints have slightly improved but his bilirubins remain elevated.  GI consultation is in the process, will likely require cholecystectomy after assessment and or treatment of common bile duct stones/pathology.             Chief Complaint:   Complicated gallstone disease    History is obtained from the patient, electronic health record and emergency department physician         History of Present Illness:   This patient is a 47 year old male who presents with several days of generalized weakness and abdominal discomfort.              Past Medical History:   I have reviewed this patient's past medical history          Past Surgical History:   Noncontributory            Social History:     Social History     Tobacco Use     Smoking status: Never Smoker     Smokeless tobacco: Never Used   Substance Use Topics     Alcohol use: Never             Family History:   Noncontributory          Immunizations:     Immunization History   Administered Date(s) Administered     COVID-19,PF,Moderna 03/24/2021, 04/21/2021, 12/30/2021             Allergies:     Allergies   Allergen Reactions     Alcohol              Medications:     Medications Prior to Admission   Medication Sig Dispense Refill Last Dose     atorvastatin (LIPITOR) 40 MG tablet Take 40 mg by mouth daily        colchicine (COLCYRS) 0.6 MG tablet Take 0.6 mg by mouth 3 times daily as needed for moderate pain        cyclobenzaprine (FLEXERIL) 5 MG tablet Take 5 mg by mouth 3 times daily as needed for  muscle spasms        dulaglutide (TRULICITY) 1.5 MG/0.5ML pen Inject 1.5 mg Subcutaneous every 7 days        lisinopril-hydrochlorothiazide (ZESTORETIC) 20-25 MG tablet Take 1 tablet by mouth daily        metFORMIN (GLUCOPHAGE XR) 500 MG 24 hr tablet Take 1,000 mg by mouth 2 times daily (with meals)        metoprolol succinate ER (TOPROL XL) 25 MG 24 hr tablet Take 25 mg by mouth daily                Review of Systems:   The 5 point Review of Systems is negative other than noted in the HPI            Physical Exam:   Vitals were reviewed  Temp: 97.9  F (36.6  C) Temp src: Oral BP: 130/81 Pulse: 82   Resp: 16 SpO2: 98 % O2 Device: None (Room air)    Constitutional:   awake, fatigued, alert, cooperative, mild distress, appears older than stated age and morbidly obese     Neck:   supple, symmetrical, trachea midline     Lungs:   no increased work of breathing, good air exchange and no retractions     Abdomen:   soft, round and sore     Skin:   normal skin color, texture, turgor          Data:   All laboratory and imaging data in the past 24 hours reviewed

## 2022-07-04 NOTE — CONSULTS
GI consult (see dict note):    Acute cholecystitis with elevated LFTs; r/o CBD stone    For EUS/ERCP in am    Shawn Larry MD

## 2022-07-04 NOTE — PLAN OF CARE
7/4/22 [id number]  Pt is A&Ox4. VSS on RA, elevated /90. Tele NSR. Up SBA in room. Reg diet. PIV infusing at 100ml/hr. Denies pain. Scattered scabs. NPO at midnight for EUS/ERCP in am. GI/Surgery following. Will continue to follow

## 2022-07-04 NOTE — PROGRESS NOTES
"[facility]    Medicine Progress Note - Hospitalist Service    Date of Admission:  7/3/2022    Assessment & Plan          Meek Anderson is a 47 year old male admitted on 7/3/2022.     Past medical history significant for HTN, HLP, DM2, Obesity, Gout who was directly admitted due to obstructive choledocholithiasis with acute cholecystitis.       Patient presented to [facility] ED due to generalized weakness.  He reported a 3-day history of a \"24-hour GI bug\".  He indicated that he is experienced 12 episodes of emesis in the first 24 hours of his illness which then subsequently improved.  He is also experienced some diarrhea with mucus that is been nonbloody.  He is also felt generally weak with full body aches as well as some lightheadedness.     Patient this morning went to a café to have breakfast since he was feeling slightly better.  While he was sitting down, after having a glass of milk he started having sweating and lightheadedness and thinks that he passed out for few seconds.     Work-up included a comprehensive metabolic panel that revealed a sodium level of 132, potassium of 3.1, creatinine of 1.47 with a GFR of 58, albumin of 3.0, alk phosphatase of 314, ALT of 159, AST of 107, direct bilirubin of 5.3, total bilirubin of 7.3 and glucose level of 215 otherwise within normal limits.  Lactic acid level, lipase and high-sensitivity troponin were all within normal limits.  CBC with differential was unremarkable.  INR was mildly elevated at 1.23.  UA revealed an wendy-colored cloudy appearance with 50 glucose, small amount of bilirubin, protein albumin of 30, urobilinogen of 4.0, small amount of blood present, WBC of 67, few bacteria present and with the presence of mucus, hyaline casts and WBC cast.  Urine culture was obtained and negative.  Respiratory PCR panel was negative.  Limited abdominal ultrasound was positive for early cholecystitis with noted few tiny " gallstones and sludge present.  EKG with normal sinus rhythm and nonischemic.  Patient has received IV fluid up to 3 L of normal saline, 3 g of Unasyn, 15 mg of IV Toradol, 4 mg of IV Zofran, GI cocktail.     Obstructive choledocholithiasis with acute cholecystitis  Transaminitis  - [facility] GI consult requested.- pending    - general surgery consulted - pending  - NPO.    - continue IV zosyn started on admission  - IV Fluids at 100 ml/hr.    - PRN Analgesics available.    - PRN antiemetics available.        DIONY - resolved  -- Likely prerenal hypokalemia from vomiting.  --Resolved with fluids.       Hyponatremia -resolved  -- Likely due to vomiting  --Resolved with IV fluids     Hypokalemia  - resolved  --Likely due to vomiting     Abnormal UA  - urine cultures - NGTD     Obesity   There is no height or weight on file to calculate BMI.  Increase in all-cause morbidity and mortality.   - Encourage weight loss.  - Follow up with PCP regarding ongoing management.       HTN  - Hold PTA Prinzide 20-25 mg/d.    - Resumed on PTA Toprol XL 25 mg/d.  Hold parameters in place.       HLP  - Hold PTA atorvastatin 40 mg/d due to elevated LFTs.       DM2  - A1c - pending  - Hold PTA metformin 1000 mg BID and weekly subcutaneous Trulicity injections.    - NPO.    - Sliding scale insulin.    - Glucose checks.    - Hypoglycemic protocol.       Gout  - Hold PTA PRN colchicine.           Diet: NPO for Medical/Clinical Reasons Except for: Meds    DVT Prophylaxis: Pneumatic Compression Devices  Warner Catheter: Not present  Central Lines: None  Cardiac Monitoring: ACTIVE order. Indication: Syncope- low cardiac risk (24 hours)  Code Status: Full Code      Disposition Plan      Expected Discharge Date: 07/05/2022                The patient's care was discussed with the Patient.    Darcy Hewitt MD  Hospitalist Service  [facility]  Securely message with the Vocera Web Console (learn more here)  Text page via [employer]  "Paging/Directory         Clinically Significant Risk Factors Present on Admission             # Hypoalbuminemia: Albumin = 3.0 g/dL (Ref range: 3.4 - 5.0 g/dL) on admission, will monitor as appropriate   # Coagulation Defect: INR = 1.23 (Ref range: 0.85 - 1.15) and/or PTT = N/A on admission, will monitor for bleeding   # Hypertension: home medication list includes antihypertensive(s)    # Severe Obesity: Estimated body mass index is 47.98 kg/m  as calculated from the following:    Height as of this encounter: 1.803 m (5' 11\").    Weight as of this encounter: 156 kg (344 lb).        ______________________________________________________________________    Interval History   Patient laying in bed, notes nausea vomiting is now resolved.  Continues to complain of abdominal pressure and not too much pain.  No other complaints.  Awaiting GI and surgery consult.    Data reviewed today: I reviewed all medications, new labs and imaging results over the last 24 hours. I personally reviewed no images or EKG's today.    Physical Exam   Vital Signs: Temp: 97.9  F (36.6  C) Temp src: Oral BP: 130/81 Pulse: 82   Resp: 16 SpO2: 98 % O2 Device: None (Room air)    Weight: 344 lbs 0 oz  General Appearance: Well appearing for stated age.  Respiratory: CTAB, no rales or ronchi  Cardiovascular: S1, S2 normal, no murmurs  GI: non-tender on palpation, BS present      Data   Recent Labs   Lab 07/04/22  0453 07/03/22  2132 07/03/22  1146   WBC 5.7  --  8.2   HGB 12.7*  --  15.3   MCV 81  --  80     --  234   INR  --   --  1.23*    137 132*   POTASSIUM 3.6  3.6 3.1* 3.1*   CHLORIDE 106 104 95   CO2 24 23 25   BUN 18 20 23   CR 1.05 1.02 1.47*   ANIONGAP 8 10 12   LOIS 8.8 9.1 9.9   * 163* 215*   ALBUMIN  --   --  3.0*   PROTTOTAL  --   --  7.6   BILITOTAL  --   --  7.3*   ALKPHOS  --   --  314*   ALT  --   --  159*   AST  --   --  107*   LIPASE  --   --  103     Recent Results (from the past 24 hour(s))   US Abdomen " Limited    Narrative    EXAM: US ABDOMEN LIMITED  LOCATION: [facility]  DATE/TIME: 7/3/2022 1:50 PM    INDICATION: Right upper quadrant pain with concerns for cholecystitis  COMPARISON: None.  TECHNIQUE: Limited abdominal ultrasound.    FINDINGS:    GALLBLADDER: Distended. There is dependent sludge and few tiny gallstones. There is mild gallbladder wall edema with the wall measuring 4 mm. There is a sonographic Odonnell's sign.    BILE DUCTS: No biliary dilatation. The common duct measures 6 mm.    LIVER: Diffuse fatty infiltration of the liver. No focal mass.    RIGHT KIDNEY: No hydronephrosis.    PANCREAS: Obscured by bowel gas.    No ascites.      Impression    IMPRESSION:  1.  Ultrasound findings of an early cholecystitis.  2.  There are a few tiny gallstones and sludge as well.

## 2022-07-04 NOTE — PLAN OF CARE
Goal Outcome Evaluation:  A&O, VSS on RA, pain on RUQ rated at 2/10, no intervention needed, NPO, A1 GB in transfers, continent x 2, 1 small soft BM this shift, IV fluids infusing at 100 ml/hr, GI and Gen. Surgery consulted. Continue to monitor.

## 2022-07-04 NOTE — H&P
"[facility]    History and Physical - Hospitalist Service       Date of Admission:  7/3/2022  PRIMARY CARE PROVIDER:    No Ref-Primary, Physician    Assessment & Plan   Meek Anderson is a 47 year old male admitted on 7/3/2022.    Past medical history significant for HTN, HLP, DM2, Obesity, Gout who was directly admitted due to obstructive choledocholithiasis with acute cholecystitis.      Patient presented to [facility] ED due to generalized weakness.  He reported a 3-day history of a \"24-hour GI bug\".  He indicated that he is experienced 12 episodes of emesis in the first 24 hours of his illness which then subsequently improved.  He is also experienced some diarrhea with mucus that is been nonbloody.  He is also felt generally weak with full body aches as well as some lightheadedness.    Patient this morning went to a café to have breakfast since he was feeling slightly better.  While he was sitting down, after having a glass of milk he started having sweating and lightheadedness and thinks that he passed out for few seconds.    Work-up included a comprehensive metabolic panel that revealed a sodium level of 132, potassium of 3.1, creatinine of 1.47 with a GFR of 58, albumin of 3.0, alk phosphatase of 314, ALT of 159, AST of 107, direct bilirubin of 5.3, total bilirubin of 7.3 and glucose level of 215 otherwise within normal limits.  Lactic acid level, lipase and high-sensitivity troponin were all within normal limits.  CBC with differential was unremarkable.  INR was mildly elevated at 1.23.  UA revealed an wendy-colored cloudy appearance with 50 glucose, small amount of bilirubin, protein albumin of 30, urobilinogen of 4.0, small amount of blood present, WBC of 67, few bacteria present and with the presence of mucus, hyaline casts and WBC cast.  Urine culture was obtained and negative.  Respiratory PCR panel was negative.  Limited abdominal ultrasound was positive for early " cholecystitis with noted few tiny gallstones and sludge present.  EKG with normal sinus rhythm and nonischemic.  Patient has received IV fluid up to 3 L of normal saline, 3 g of Unasyn, 15 mg of IV Toradol, 4 mg of IV Zofran, GI cocktail.    Obstructive choledocholithiasis with acute cholecystitis  Transaminitis  - [facility] GI consult requested.    - NPO.    - IV zosyn ordered  - IV Fluids at 100 ml/hr.    - PRN Analgesics available.    - PRN antiemetics available.    - Repeat labs in the morning (CMP and CBC with platelets).    - general surgery consulted.     DIONY  - IV Fluids at 100 ml/hr.  Repeat bmp ordered .  - CMP in the morning.      Hyponatremia  - Check sodium level now.  - CMP in the morning.      Hypokalemia  - Check potassium now.      Abnormal UA  - Follow urine cultures.      Obesity   There is no height or weight on file to calculate BMI.  Increase in all-cause morbidity and mortality.   - Encourage weight loss.  - Follow up with PCP regarding ongoing management.      HTN  - Hold PTA Prinzide 20-25 mg/d.    - Resumed on PTA Toprol XL 25 mg/d.  Hold parameters in place.      HLP  - Hold PTA atorvastatin 40 mg/d due to elevated LFTs.      DM2  - check A1c.    - Hold PTA metformin 1000 mg BID and weekly subcutaneous Trulicity injections.    - NPO.    - Sliding scale insulin.    - Glucose checks.    - Hypoglycemic protocol.      Gout  - Hold PTA PRN colchicine.      Clinically Significant Risk Factors Present on Admission         # Hyponatremia: Na = 132 mmol/L (Ref range: 133 - 144 mmol/L) on admission, will monitor as appropriate     # Hypoalbuminemia: Albumin = 3.0 g/dL (Ref range: 3.4 - 5.0 g/dL) on admission, will monitor as appropriate   # Coagulation Defect: INR = 1.23 (Ref range: 0.85 - 1.15) and/or PTT = N/A on admission, will monitor for bleeding      # Severe Obesity: Estimated body mass index is 47.98 kg/m  as calculated from the following:    Height as of an earlier encounter on 7/3/22: 1.803 m  "(5' 11\").    Weight as of an earlier encounter on 7/3/22: 156 kg (344 lb).             Diet: npo  DVT Prophylaxis: Pneumatic Compression Devices  Warner Catheter: Not present  Central Lines: None  Cardiac Monitoring: ACTIVE order. Indication: Syncope- low cardiac risk (24 hours)  Code Status:  full code        Recent Labs   Lab 07/03/22  1146   WBC 8.2   HGB 15.3   MCV 80      INR 1.23*   *   POTASSIUM 3.1*   CHLORIDE 95   CO2 25   BUN 23   CR 1.47*   ANIONGAP 12   LOIS 9.9   *   ALBUMIN 3.0*   PROTTOTAL 7.6   BILITOTAL 7.3*   ALKPHOS 314*   *   *   LIPASE 103     Entered: Salma Fleming MD 07/03/2022, 9:03 PM      Salma Fleming MD  [facility]  Securely message with the Vocera Web Console (learn more here)  Text page via [facility] Paging/Directory    ______________________________________________________________________    Chief Complaint   Direct admit due to obstructive choledocholithiasis with acute cholecystitis.     History is obtained from the patient and EMR.      History of Present Illness   Meek Anderson is a 47 year old male with past medical history significant for HTN, HLP, DM2, Obesity, Gout who was directly admitted due to obstructive choledocholithiasis with acute cholecystitis.      Patient presented to [facility] ED due to generalized weakness.  He reported a 3-day history of a \"24-hour GI bug\".  He indicated that he is experienced 12 episodes of emesis in the first 24 hours of his illness which then subsequently improved.  He is also experienced some diarrhea with mucus that is been nonbloody.  He is also felt generally weak with full body aches as well as some lightheadedness.    Work-up included a comprehensive metabolic panel that revealed a sodium level of 132, potassium of 3.1, creatinine of 1.47 with a GFR of 58, albumin of 3.0, alk phosphatase of 314, ALT of 159, AST of 107, direct bilirubin of 5.3, total bilirubin of " 7.3 and glucose level of 215 otherwise within normal limits.  Lactic acid level, lipase and high-sensitivity troponin were all within normal limits.  CBC with differential was unremarkable.  INR was mildly elevated at 1.23.  UA revealed an wendy-colored cloudy appearance with 50 glucose, small amount of bilirubin, protein albumin of 30, urobilinogen of 4.0, small amount of blood present, WBC of 67, few bacteria present and with the presence of mucus, hyaline casts and WBC cast.  Urine culture was obtained and negative.  Respiratory PCR panel was negative.  Limited abdominal ultrasound was positive for early cholecystitis with noted few tiny gallstones and sludge present.  EKG with normal sinus rhythm and nonischemic.  Patient has received IV fluid up to 3 L of normal saline, 3 g of Unasyn, 15 mg of IV Toradol, 4 mg of IV Zofran, GI cocktail.        Review of Systems    The 10 point Review of Systems is negative other than noted in the HPI.      Past Medical History    I have reviewed this patient's medical history and updated it with pertinent information if needed.   No past medical history on file. HTN, HLP, DM2, Obesity, Gout    Past Surgical History   I have reviewed this patient's surgical history and updated it with pertinent information if needed.  No past surgical history on file.    Social History   I have reviewed this patient's social history and updated it with pertinent information if needed.  Patient resides at home. He has never smoked.  He does not consume alcohol.  He does not use illicit drugs.    Social History     Tobacco Use     Smoking status: Never Smoker     Smokeless tobacco: Never Used   Substance Use Topics     Alcohol use: Never     Drug use: Never        Family History   I have reviewed this patient's family history and updated it with pertinent information if needed.   No family history on file.   Mother: Heart murmur.    Paternal grandfather: Heart disease and DM2.      Prior to  Admission Medications   None     Allergies   Allergies   Allergen Reactions     Alcohol        Physical Exam   Vital Signs: Temp: 99.1  F (37.3  C) Temp src: Oral BP: 107/63 Pulse: 83   Resp: 18 SpO2: 96 % O2 Device: None (Room air)    Weight: 0 lbs 0 oz    Constitutional: Awake, alert, cooperative, no apparent distress.  obese  ENT: Normocephalic, without obvious abnormality, atraumatic, oral pharynx with moist mucus membranes, tonsils without erythema or exudates.  Eyes pupils are equal, round and reactive to light; extra occular movements intact.  Normal sclera.    Neck: Supple, symmetrical, trachea midline, no adenopathy.  Pulmonary: No increased work of breathing, good air exchange, clear to auscultation bilaterally, no crackles or wheezing.  Cardiovascular: Regular rate and rhythm, normal S1 and S2, no S3 or S4, and no murmur noted.  GI: Normal bowel sounds, soft, non-distended, non-tender.  Obese.. moderate ruq tenderness  Skin/Integumen: Visualized skin appeared clear.  Neuro: CN II-XII grossly intact.  Upper and lower extremities strength, coordination and sensation intact bilaterally.    Psych:  Alert and oriented x 3. Normal affect.  Extremities: No lower extremity edema noted, and calves are non-tender to palpation bilaterally. Dorsal pedal pulses and posterior tibial pulses palpable.    : Warner catheter in place with clear yellow urine in the bag.      Data   Data reviewed today: I reviewed all medications, new labs and imaging results over the last 24 hours. I personally reviewed the us abdomen  image(s) showing cholecystitis .    Recent Labs   Lab 07/03/22  1146   WBC 8.2   HGB 15.3   MCV 80      INR 1.23*   *   POTASSIUM 3.1*   CHLORIDE 95   CO2 25   BUN 23   CR 1.47*   ANIONGAP 12   LOIS 9.9   *   ALBUMIN 3.0*   PROTTOTAL 7.6   BILITOTAL 7.3*   ALKPHOS 314*   *   *   LIPASE 103

## 2022-07-05 ENCOUNTER — ANESTHESIA EVENT (OUTPATIENT)
Dept: SURGERY | Facility: CLINIC | Age: 47
DRG: 418 | End: 2022-07-05
Payer: COMMERCIAL

## 2022-07-05 ENCOUNTER — ANESTHESIA (OUTPATIENT)
Dept: SURGERY | Facility: CLINIC | Age: 47
DRG: 418 | End: 2022-07-05
Payer: COMMERCIAL

## 2022-07-05 LAB
ALBUMIN SERPL-MCNC: 2.5 G/DL (ref 3.4–5)
ALP SERPL-CCNC: 264 U/L (ref 40–150)
ALT SERPL W P-5'-P-CCNC: 92 U/L (ref 0–70)
ANION GAP SERPL CALCULATED.3IONS-SCNC: 7 MMOL/L (ref 3–14)
AST SERPL W P-5'-P-CCNC: 39 U/L (ref 0–45)
BACTERIA UR CULT: NORMAL
BILIRUB DIRECT SERPL-MCNC: 0.8 MG/DL (ref 0–0.2)
BILIRUB SERPL-MCNC: 1.9 MG/DL (ref 0.2–1.3)
BUN SERPL-MCNC: 13 MG/DL (ref 7–30)
CALCIUM SERPL-MCNC: 9.3 MG/DL (ref 8.5–10.1)
CHLORIDE BLD-SCNC: 109 MMOL/L (ref 94–109)
CO2 SERPL-SCNC: 23 MMOL/L (ref 20–32)
CREAT SERPL-MCNC: 0.76 MG/DL (ref 0.66–1.25)
GFR SERPL CREATININE-BSD FRML MDRD: >90 ML/MIN/1.73M2
GLUCOSE BLD-MCNC: 169 MG/DL (ref 70–99)
GLUCOSE BLDC GLUCOMTR-MCNC: 131 MG/DL (ref 70–99)
GLUCOSE BLDC GLUCOMTR-MCNC: 131 MG/DL (ref 70–99)
POTASSIUM BLD-SCNC: 3.6 MMOL/L (ref 3.4–5.3)
PROT SERPL-MCNC: 6.7 G/DL (ref 6.8–8.8)
SODIUM SERPL-SCNC: 139 MMOL/L (ref 133–144)
UPPER EUS: NORMAL

## 2022-07-05 PROCEDURE — 82248 BILIRUBIN DIRECT: CPT | Performed by: INTERNAL MEDICINE

## 2022-07-05 PROCEDURE — 0DJ08ZZ INSPECTION OF UPPER INTESTINAL TRACT, VIA NATURAL OR ARTIFICIAL OPENING ENDOSCOPIC: ICD-10-PCS | Performed by: INTERNAL MEDICINE

## 2022-07-05 PROCEDURE — 250N000025 HC SEVOFLURANE, PER MIN: Performed by: INTERNAL MEDICINE

## 2022-07-05 PROCEDURE — 36415 COLL VENOUS BLD VENIPUNCTURE: CPT | Performed by: INTERNAL MEDICINE

## 2022-07-05 PROCEDURE — 120N000001 HC R&B MED SURG/OB

## 2022-07-05 PROCEDURE — 99233 SBSQ HOSP IP/OBS HIGH 50: CPT | Performed by: HOSPITALIST

## 2022-07-05 PROCEDURE — 250N000011 HC RX IP 250 OP 636: Performed by: HOSPITALIST

## 2022-07-05 PROCEDURE — 258N000003 HC RX IP 258 OP 636: Performed by: HOSPITALIST

## 2022-07-05 PROCEDURE — 47562 LAPAROSCOPIC CHOLECYSTECTOMY: CPT | Mod: AS | Performed by: PHYSICIAN ASSISTANT

## 2022-07-05 PROCEDURE — 250N000011 HC RX IP 250 OP 636: Performed by: NURSE ANESTHETIST, CERTIFIED REGISTERED

## 2022-07-05 PROCEDURE — 250N000009 HC RX 250: Performed by: NURSE ANESTHETIST, CERTIFIED REGISTERED

## 2022-07-05 PROCEDURE — 360N000075 HC SURGERY LEVEL 2, PER MIN: Performed by: INTERNAL MEDICINE

## 2022-07-05 PROCEDURE — 250N000011 HC RX IP 250 OP 636: Performed by: ANESTHESIOLOGY

## 2022-07-05 PROCEDURE — 710N000009 HC RECOVERY PHASE 1, LEVEL 1, PER MIN: Performed by: INTERNAL MEDICINE

## 2022-07-05 PROCEDURE — 99231 SBSQ HOSP IP/OBS SF/LOW 25: CPT | Mod: 57 | Performed by: SURGERY

## 2022-07-05 PROCEDURE — 999N000141 HC STATISTIC PRE-PROCEDURE NURSING ASSESSMENT: Performed by: INTERNAL MEDICINE

## 2022-07-05 PROCEDURE — 370N000017 HC ANESTHESIA TECHNICAL FEE, PER MIN: Performed by: INTERNAL MEDICINE

## 2022-07-05 PROCEDURE — 258N000003 HC RX IP 258 OP 636: Performed by: ANESTHESIOLOGY

## 2022-07-05 RX ORDER — FENTANYL CITRATE 0.05 MG/ML
50 INJECTION, SOLUTION INTRAMUSCULAR; INTRAVENOUS
Status: DISCONTINUED | OUTPATIENT
Start: 2022-07-05 | End: 2022-07-05 | Stop reason: HOSPADM

## 2022-07-05 RX ORDER — LIDOCAINE 40 MG/G
CREAM TOPICAL
Status: DISCONTINUED | OUTPATIENT
Start: 2022-07-05 | End: 2022-07-05 | Stop reason: HOSPADM

## 2022-07-05 RX ORDER — INDOMETHACIN 50 MG/1
100 SUPPOSITORY RECTAL
Status: DISCONTINUED | OUTPATIENT
Start: 2022-07-05 | End: 2022-07-05 | Stop reason: HOSPADM

## 2022-07-05 RX ORDER — ONDANSETRON 2 MG/ML
4 INJECTION INTRAMUSCULAR; INTRAVENOUS EVERY 6 HOURS PRN
Status: DISCONTINUED | OUTPATIENT
Start: 2022-07-05 | End: 2022-07-07 | Stop reason: HOSPADM

## 2022-07-05 RX ORDER — FENTANYL CITRATE 0.05 MG/ML
25 INJECTION, SOLUTION INTRAMUSCULAR; INTRAVENOUS EVERY 5 MIN PRN
Status: DISCONTINUED | OUTPATIENT
Start: 2022-07-05 | End: 2022-07-05 | Stop reason: HOSPADM

## 2022-07-05 RX ORDER — FLUMAZENIL 0.1 MG/ML
0.2 INJECTION, SOLUTION INTRAVENOUS
Status: ACTIVE | OUTPATIENT
Start: 2022-07-05 | End: 2022-07-06

## 2022-07-05 RX ORDER — OXYCODONE HYDROCHLORIDE 5 MG/1
5 TABLET ORAL EVERY 4 HOURS PRN
Status: DISCONTINUED | OUTPATIENT
Start: 2022-07-05 | End: 2022-07-05 | Stop reason: HOSPADM

## 2022-07-05 RX ORDER — LABETALOL HYDROCHLORIDE 5 MG/ML
10 INJECTION, SOLUTION INTRAVENOUS ONCE
Status: COMPLETED | OUTPATIENT
Start: 2022-07-05 | End: 2022-07-05

## 2022-07-05 RX ORDER — ONDANSETRON 2 MG/ML
INJECTION INTRAMUSCULAR; INTRAVENOUS PRN
Status: DISCONTINUED | OUTPATIENT
Start: 2022-07-05 | End: 2022-07-05

## 2022-07-05 RX ORDER — DEXAMETHASONE SODIUM PHOSPHATE 4 MG/ML
INJECTION, SOLUTION INTRA-ARTICULAR; INTRALESIONAL; INTRAMUSCULAR; INTRAVENOUS; SOFT TISSUE PRN
Status: DISCONTINUED | OUTPATIENT
Start: 2022-07-05 | End: 2022-07-05

## 2022-07-05 RX ORDER — HYDROMORPHONE HCL IN WATER/PF 6 MG/30 ML
0.4 PATIENT CONTROLLED ANALGESIA SYRINGE INTRAVENOUS EVERY 5 MIN PRN
Status: DISCONTINUED | OUTPATIENT
Start: 2022-07-05 | End: 2022-07-05 | Stop reason: HOSPADM

## 2022-07-05 RX ORDER — ONDANSETRON 4 MG/1
4 TABLET, ORALLY DISINTEGRATING ORAL EVERY 30 MIN PRN
Status: DISCONTINUED | OUTPATIENT
Start: 2022-07-05 | End: 2022-07-05 | Stop reason: HOSPADM

## 2022-07-05 RX ORDER — PROPOFOL 10 MG/ML
INJECTION, EMULSION INTRAVENOUS PRN
Status: DISCONTINUED | OUTPATIENT
Start: 2022-07-05 | End: 2022-07-05

## 2022-07-05 RX ORDER — LIDOCAINE HYDROCHLORIDE 20 MG/ML
INJECTION, SOLUTION INFILTRATION; PERINEURAL PRN
Status: DISCONTINUED | OUTPATIENT
Start: 2022-07-05 | End: 2022-07-05

## 2022-07-05 RX ORDER — ONDANSETRON 4 MG/1
4 TABLET, ORALLY DISINTEGRATING ORAL EVERY 6 HOURS PRN
Status: DISCONTINUED | OUTPATIENT
Start: 2022-07-05 | End: 2022-07-07 | Stop reason: HOSPADM

## 2022-07-05 RX ORDER — SODIUM CHLORIDE, SODIUM LACTATE, POTASSIUM CHLORIDE, CALCIUM CHLORIDE 600; 310; 30; 20 MG/100ML; MG/100ML; MG/100ML; MG/100ML
INJECTION, SOLUTION INTRAVENOUS CONTINUOUS
Status: DISCONTINUED | OUTPATIENT
Start: 2022-07-05 | End: 2022-07-05 | Stop reason: HOSPADM

## 2022-07-05 RX ORDER — FENTANYL CITRATE 50 UG/ML
INJECTION, SOLUTION INTRAMUSCULAR; INTRAVENOUS PRN
Status: DISCONTINUED | OUTPATIENT
Start: 2022-07-05 | End: 2022-07-05

## 2022-07-05 RX ORDER — ONDANSETRON 2 MG/ML
4 INJECTION INTRAMUSCULAR; INTRAVENOUS EVERY 30 MIN PRN
Status: DISCONTINUED | OUTPATIENT
Start: 2022-07-05 | End: 2022-07-05 | Stop reason: HOSPADM

## 2022-07-05 RX ADMIN — PROPOFOL 300 MG: 10 INJECTION, EMULSION INTRAVENOUS at 16:56

## 2022-07-05 RX ADMIN — HYDROMORPHONE HYDROCHLORIDE 0.4 MG: 0.2 INJECTION, SOLUTION INTRAMUSCULAR; INTRAVENOUS; SUBCUTANEOUS at 18:05

## 2022-07-05 RX ADMIN — LABETALOL HYDROCHLORIDE 10 MG: 5 INJECTION INTRAVENOUS at 15:21

## 2022-07-05 RX ADMIN — FENTANYL CITRATE 100 MCG: 50 INJECTION, SOLUTION INTRAMUSCULAR; INTRAVENOUS at 16:56

## 2022-07-05 RX ADMIN — PIPERACILLIN AND TAZOBACTAM 4.5 G: 4; .5 INJECTION, POWDER, FOR SOLUTION INTRAVENOUS at 10:07

## 2022-07-05 RX ADMIN — PIPERACILLIN AND TAZOBACTAM 4.5 G: 4; .5 INJECTION, POWDER, FOR SOLUTION INTRAVENOUS at 03:23

## 2022-07-05 RX ADMIN — SODIUM CHLORIDE, POTASSIUM CHLORIDE, SODIUM LACTATE AND CALCIUM CHLORIDE: 600; 310; 30; 20 INJECTION, SOLUTION INTRAVENOUS at 15:21

## 2022-07-05 RX ADMIN — LIDOCAINE HYDROCHLORIDE 100 MG: 20 INJECTION, SOLUTION INFILTRATION; PERINEURAL at 16:56

## 2022-07-05 RX ADMIN — SODIUM CHLORIDE: 9 INJECTION, SOLUTION INTRAVENOUS at 05:06

## 2022-07-05 RX ADMIN — ONDANSETRON 4 MG: 2 INJECTION INTRAMUSCULAR; INTRAVENOUS at 17:12

## 2022-07-05 RX ADMIN — SODIUM CHLORIDE: 9 INJECTION, SOLUTION INTRAVENOUS at 18:56

## 2022-07-05 RX ADMIN — SUCCINYLCHOLINE CHLORIDE 140 MG: 20 INJECTION, SOLUTION INTRAMUSCULAR; INTRAVENOUS; PARENTERAL at 16:56

## 2022-07-05 RX ADMIN — PIPERACILLIN AND TAZOBACTAM 4.5 G: 4; .5 INJECTION, POWDER, FOR SOLUTION INTRAVENOUS at 21:23

## 2022-07-05 RX ADMIN — PIPERACILLIN AND TAZOBACTAM 4.5 G: 4; .5 INJECTION, POWDER, FOR SOLUTION INTRAVENOUS at 15:59

## 2022-07-05 RX ADMIN — MIDAZOLAM 2 MG: 1 INJECTION INTRAMUSCULAR; INTRAVENOUS at 16:47

## 2022-07-05 RX ADMIN — DEXAMETHASONE SODIUM PHOSPHATE 4 MG: 4 INJECTION, SOLUTION INTRA-ARTICULAR; INTRALESIONAL; INTRAMUSCULAR; INTRAVENOUS; SOFT TISSUE at 17:02

## 2022-07-05 ASSESSMENT — ACTIVITIES OF DAILY LIVING (ADL)
ADLS_ACUITY_SCORE: 34

## 2022-07-05 NOTE — PLAN OF CARE
[id number]. A&Ox4, VSS on RA. Patient denies pain. Up with SBA in room, voiding in urinal during night. Patient abd RUQ tender to touch. On intermittent abx. PIV infusing NS at 100. Patient NPO. Endoscopic ultrasound and possible ERCP/sphincterotomy today. Continue to monitor.

## 2022-07-05 NOTE — PROGRESS NOTES
General surgery  Patient presented with evidence of choledocholithiasis and possible cholecystitis.  Plan is for ERCP today.  We will tentatively put him on for laparoscopic cholecystectomy tomorrow morning.  He can have clear liquids following his ERCP today if approved by gastroenterology.  Richard Hammonds MD  General Surgery, Office [phone]

## 2022-07-05 NOTE — ANESTHESIA POSTPROCEDURE EVALUATION
Patient: Meek Anderson    Procedure: Procedure(s):  ENDOSCOPIC ULTRASOUND, ESOPHAGOSCOPY / UPPER GASTROINTESTINAL TRACT (GI)       Anesthesia Type:  General    Note:  Disposition: Admission   Postop Pain Control: Uneventful            Sign Out: Well controlled pain   PONV: No   Neuro/Psych: Uneventful            Sign Out: Acceptable/Baseline neuro status   Airway/Respiratory: Uneventful            Sign Out: Acceptable/Baseline resp. status   CV/Hemodynamics: Uneventful            Sign Out: Acceptable CV status; No obvious hypovolemia; No obvious fluid overload   Other NRE: NONE   DID A NON-ROUTINE EVENT OCCUR? No           Last vitals:  Vitals Value Taken Time   /101 07/05/22 1820   Temp 36.6  C (97.9  F) 07/05/22 1800   Pulse 71 07/05/22 1821   Resp 8 07/05/22 1821   SpO2 97 % 07/05/22 1824   Vitals shown include unvalidated device data.    Electronically Signed By: Cathi Cole MD  July 5, 2022  6:36 PM

## 2022-07-05 NOTE — ANESTHESIA CARE TRANSFER NOTE
Patient: Meek Anderson    Procedure: Procedure(s):  ENDOSCOPIC ULTRASOUND, ESOPHAGOSCOPY / UPPER GASTROINTESTINAL TRACT (GI)       Diagnosis: Elevated liver function tests [R79.89]  Diagnosis Additional Information: No value filed.    Anesthesia Type:   General     Note:    Oropharynx: oropharynx clear of all foreign objects and spontaneously breathing  Level of Consciousness: awake  Oxygen Supplementation: face mask  Level of Supplemental Oxygen (L/min / FiO2): 6  Independent Airway: airway patency satisfactory and stable  Dentition: dentition unchanged  Vital Signs Stable: post-procedure vital signs reviewed and stable  Report to RN Given: handoff report given  Patient transferred to: PACU    Handoff Report: Identifed the Patient, Identified the Reponsible Provider, Reviewed the pertinent medical history, Discussed the surgical course, Reviewed Intra-OP anesthesia mangement and issues during anesthesia, Set expectations for post-procedure period and Allowed opportunity for questions and acknowledgement of understanding      Vitals:  Vitals Value Taken Time   /101 07/05/22 1732   Temp     Pulse 90 07/05/22 1734   Resp 16 07/05/22 1734   SpO2 98 % 07/05/22 1734   Vitals shown include unvalidated device data.    Electronically Signed By: NORMA Lawler CRNA  July 5, 2022  5:35 PM

## 2022-07-05 NOTE — ANESTHESIA PROCEDURE NOTES
Airway         Procedure Start/Stop Times: 7/5/2022 4:58 PM  Staff -        CRNA: Licha Montgomery APRN CRNA       Performed By: CRNA  Consent for Airway        Urgency: elective  Indications and Patient Condition       Indications for airway management: ana-procedural       Induction type:RSI       Mask difficulty assessment: 0 - not attempted    Final Airway Details       Final airway type: endotracheal airway       Successful airway: ETT - single and Oral  Endotracheal Airway Details        ETT size (mm): 8.0       Cuffed: yes       Successful intubation technique: video laryngoscopy       VL Blade Size: Glidescope 4       Grade View of Cords: 1       Adjucts: stylet       Position: Right       Measured from: lips       Secured at (cm): 23    Post intubation assessment        Placement verified by: capnometry, equal breath sounds and chest rise        Number of attempts at approach: 1       Number of other approaches attempted: 0       Secured with: pink tape       Ease of procedure: easy       Dentition: Intact and Unchanged    Medication(s) Administered   Medication Administration Time: 7/5/2022 4:58 PM

## 2022-07-05 NOTE — ANESTHESIA PREPROCEDURE EVALUATION
Anesthesia Pre-Procedure Evaluation    Patient: Meek Anderson   MRN: [id number] : 1975        Procedure : Procedure(s):  ENDOSCOPIC RETROGRADE CHOLANGIOPANCREATOGRAPHY  ENDOSCOPIC ULTRASOUND, ESOPHAGOSCOPY / UPPER GASTROINTESTINAL TRACT (GI)          Past Medical History:   Diagnosis Date     Diabetes (H)      Obese       Past Surgical History:   Procedure Laterality Date     ENT SURGERY        Allergies   Allergen Reactions     Alcohol       Social History     Tobacco Use     Smoking status: Never Smoker     Smokeless tobacco: Never Used   Substance Use Topics     Alcohol use: Never      Wt Readings from Last 1 Encounters:   22 (!) 156 kg (344 lb)        Prior to Admission medications    Medication Sig Start Date End Date Taking? Authorizing Provider   atorvastatin (LIPITOR) 40 MG tablet Take 40 mg by mouth daily   Yes Unknown, Entered By History   colchicine (COLCYRS) 0.6 MG tablet Take 0.6 mg by mouth 3 times daily as needed for moderate pain   Yes Unknown, Entered By History   cyclobenzaprine (FLEXERIL) 5 MG tablet Take 5 mg by mouth 3 times daily as needed for muscle spasms   Yes Unknown, Entered By History   dulaglutide (TRULICITY) 1.5 MG/0.5ML pen Inject 1.5 mg Subcutaneous every 7 days   Yes Unknown, Entered By History   lisinopril-hydrochlorothiazide (ZESTORETIC) 20-25 MG tablet Take 1 tablet by mouth daily   Yes Unknown, Entered By History   metFORMIN (GLUCOPHAGE XR) 500 MG 24 hr tablet Take 1,000 mg by mouth 2 times daily (with meals)   Yes Unknown, Entered By History   metoprolol succinate ER (TOPROL XL) 25 MG 24 hr tablet Take 25 mg by mouth daily   Yes Unknown, Entered By History     ECG 7/3/22: Sinus  Rhythm   Low voltage in precordial leads.    -Old anterior infarct.    -  Nonspecific T-abnormality.      Anesthesia Evaluation   Pt has had prior anesthetic.     No history of anesthetic complications       ROS/MED HX  ENT/Pulmonary:    (-) sleep apnea   Neurologic:        Cardiovascular:     (+) Dyslipidemia hypertension-range: controlled typically but has been off BP meds bc of this/ ----    METS/Exercise Tolerance:     Hematologic:       Musculoskeletal: Comment: Gout      GI/Hepatic:     (+) cholecystitis/cholelithiasis,  (-) GERD   Renal/Genitourinary:     (+) renal disease, type: ARF,     Endo:     (+) type II DM, Not using insulin, Obesity (BMI 48),     Psychiatric/Substance Use:       Infectious Disease:       Malignancy:       Other:            Physical Exam    Airway        Mallampati: II   TM distance: > 3 FB   Neck ROM: full   Mouth opening: > 3 cm    Respiratory Devices and Support         Dental     Comment: Missing multiple, and front upper crown loose        Cardiovascular   cardiovascular exam normal          Pulmonary   pulmonary exam normal            Other findings: Beard but airway appears easy    OUTSIDE LABS:  CBC:   Lab Results   Component Value Date    WBC 5.7 07/04/2022    WBC 8.2 07/03/2022    HGB 12.7 (L) 07/04/2022    HGB 15.3 07/03/2022    HCT 36.7 (L) 07/04/2022    HCT 42.8 07/03/2022     07/04/2022     07/03/2022     BMP:   Lab Results   Component Value Date     07/05/2022     07/04/2022    POTASSIUM 3.6 07/05/2022    POTASSIUM 3.6 07/04/2022    POTASSIUM 3.6 07/04/2022    CHLORIDE 109 07/05/2022    CHLORIDE 106 07/04/2022    CO2 23 07/05/2022    CO2 24 07/04/2022    BUN 13 07/05/2022    BUN 18 07/04/2022    CR 0.76 07/05/2022    CR 1.05 07/04/2022     (H) 07/05/2022     (H) 07/04/2022     COAGS:   Lab Results   Component Value Date    INR 1.23 (H) 07/03/2022     POC: No results found for: BGM, HCG, HCGS  HEPATIC:   Lab Results   Component Value Date    ALBUMIN 2.5 (L) 07/05/2022    PROTTOTAL 6.7 (L) 07/05/2022    ALT 92 (H) 07/05/2022    AST 39 07/05/2022    ALKPHOS 264 (H) 07/05/2022    BILITOTAL 1.9 (H) 07/05/2022     OTHER:   Lab Results   Component Value Date    LACT 1.5 07/03/2022    A1C 6.9 (H) 07/04/2022     LOIS 9.3 07/05/2022    LIPASE 103 07/03/2022       Anesthesia Plan    ASA Status:  3   NPO Status:  NPO Appropriate    Anesthesia Type: General.     - Airway: ETT   Induction: Intravenous, RSI.   Maintenance: Balanced.   Techniques and Equipment:     - Airway: Video-Laryngoscope         Consents    Anesthesia Plan(s) and associated risks, benefits, and realistic alternatives discussed. Questions answered and patient/representative(s) expressed understanding.    - Discussed:     - Discussed with:  Patient         Postoperative Care    Pain management: IV analgesics, Multi-modal analgesia.   PONV prophylaxis: Ondansetron (or other 5HT-3)     Comments:    Other Comments: H&P reviewed            Armond Jameson MD

## 2022-07-05 NOTE — PLAN OF CARE
7/5/22 [id number]  Pt is A&Ox4. VSS on RA, elevated /91. Tele NSR. Up SBA in room. Clear liq diet. PIV infusing at 100ml/hr. Denies pain. Scattered scabs. EUS done this evening. NPO at midnight for surgery in am 0730. GI/Surgery following. Sticky note placed for MD to address BP meds. Will continue to follow.

## 2022-07-05 NOTE — PROGRESS NOTES
"[facility]    Medicine Progress Note - Hospitalist Service    Date of Admission:  7/3/2022    Assessment & Plan          Meek Anderson is a 47 year old male admitted on 7/3/2022 who was directly admitted due to obstructive choledocholithiasis with acute cholecystitis after he  presented to [facility] ED due to generalized weakness.        Patient.  He reported a 3-day history of a \"24-hour GI bug\".  He indicated that he is experienced 12 episodes of emesis in the first 24 hours of his illness which then subsequently improved.  He is also experienced some diarrhea with mucus that is been nonbloody.  He is also felt generally weak with full body aches as well as some lightheadedness.     Patient this morning went to a café to have breakfast since he was feeling slightly better.  While he was sitting down, after having a glass of milk he started having sweating and lightheadedness and thinks that he passed out for few seconds.       Obstructive choledocholithiasis with acute cholecystitis  Transaminitis  *  Limited abdominal ultrasound was positive for early cholecystitis with noted few tiny gallstones and sludge present.  * labs revealed alk phosphatase of 314, ALT of 159, AST of 107, direct bilirubin of 5.3, total bilirubin of 7.3 . Labs now trending down.  * started on unasyn in previous facility  Plan  - [facility] GI consult requested.- input appreciated. Planned for ERCp today. The plan will be for endoscopic ultrasound and possible ERCP/sphincterotomy tomorrow, followed by a cholecystectomy  - general surgery consulted -input appreciated. Plan for cholecystectomy tomorrow.  - NPO.    - continue IV zosyn started on admission  - IV Fluids at 100 ml/hr.    - PRN Analgesics available.    - PRN antiemetics available.      DIONY - resolved  -- Cr 1.47 on admission  -- Likely prerenal from vomiting.  --Resolved with fluids.       Hyponatremia -resolved  -- Likely due to " "vomiting  --Resolved with IV fluids     Hypokalemia  - resolved  --Likely due to vomiting     Abnormal UA  - urine cultures - NGTD    Possible syncope  -- patient reports losing consciousness while at the cafe along with lightheadedness and sweating  -- likely vasovagal syncope especially in the setting constant vomiting   -- less likely cardiac and less likely seizures  -- will monitor for recurrence of symptoms and will monitor on tele.      Obesity   There is no height or weight on file to calculate BMI.  Increase in all-cause morbidity and mortality.   - Encourage weight loss.  - Follow up with PCP regarding ongoing management.       HTN  - Hold PTA Prinzide 20-25 mg/d.    - Resumed on PTA Toprol XL 25 mg/d.  Hold parameters in place.       HLP  - Hold PTA atorvastatin 40 mg/d due to elevated LFTs.       DM2  - A1c - pending  - Hold PTA metformin 1000 mg BID and weekly subcutaneous Trulicity injections.    - NPO.    - Sliding scale insulin.    - Glucose checks.    - Hypoglycemic protocol.       Gout  - Hold PTA PRN colchicine.           Diet: Regular Diet Adult    DVT Prophylaxis: Pneumatic Compression Devices  Warner Catheter: Not present  Central Lines: None  Cardiac Monitoring: ACTIVE order. Indication: Syncope- low cardiac risk (24 hours)  Code Status: Full Code      Disposition Plan      Expected Discharge Date: 07/08/2022                The patient's care was discussed with the Patient.    Darcy Hewitt MD  Hospitalist Service  [facility]  Securely message with the Vocera Web Console (learn more here)  Text page via [employer] Paging/Directory         Clinically Significant Risk Factors Present on Admission                # Hypertension: home medication list includes antihypertensive(s)  # Severe Obesity: Estimated body mass index is 47.98 kg/m  as calculated from the following:    Height as of this encounter: 1.803 m (5' 11\").    Weight as of this encounter: 156 kg (344 lb).    "     ______________________________________________________________________    Interval History   Laying in bed, no complaints, denies any pain at this time.   Pending ERCP today    Data reviewed today: I reviewed all medications, new labs and imaging results over the last 24 hours. I personally reviewed no images or EKG's today.    Physical Exam   Vital Signs: Temp: 98.5  F (36.9  C) Temp src: Oral BP: (!) 152/97 Pulse: 76   Resp: 16 SpO2: 98 % O2 Device: None (Room air)    Weight: 344 lbs 0 oz  General Appearance: Well appearing for stated age.  Respiratory: CTAB, no rales or ronchi  Cardiovascular: S1, S2 normal, no murmurs  GI: non-tender on palpation, BS present      Data   Recent Labs   Lab 07/05/22  0711 07/04/22  0453 07/03/22  2132 07/03/22  1146   WBC  --  5.7  --  8.2   HGB  --  12.7*  --  15.3   MCV  --  81  --  80   PLT  --  190  --  234   INR  --   --   --  1.23*    138 137 132*   POTASSIUM 3.6 3.6  3.6 3.1* 3.1*   CHLORIDE 109 106 104 95   CO2 23 24 23 25   BUN 13 18 20 23   CR 0.76 1.05 1.02 1.47*   ANIONGAP 7 8 10 12   LOIS 9.3 8.8 9.1 9.9   * 164* 163* 215*   ALBUMIN 2.5* 2.4*  --  3.0*   PROTTOTAL 6.7* 6.5*  --  7.6   BILITOTAL 1.9* 3.5*  --  7.3*   ALKPHOS 264* 258*  --  314*   ALT 92* 117*  --  159*   AST 39 71*  --  107*   LIPASE  --   --   --  103     No results found for this or any previous visit (from the past 24 hour(s)).

## 2022-07-06 ENCOUNTER — ANESTHESIA (OUTPATIENT)
Dept: SURGERY | Facility: CLINIC | Age: 47
DRG: 418 | End: 2022-07-06
Payer: COMMERCIAL

## 2022-07-06 ENCOUNTER — ANESTHESIA EVENT (OUTPATIENT)
Dept: SURGERY | Facility: CLINIC | Age: 47
DRG: 418 | End: 2022-07-06
Payer: COMMERCIAL

## 2022-07-06 LAB
ALBUMIN SERPL-MCNC: 2.7 G/DL (ref 3.4–5)
ALP SERPL-CCNC: 239 U/L (ref 40–150)
ALT SERPL W P-5'-P-CCNC: 88 U/L (ref 0–70)
AST SERPL W P-5'-P-CCNC: 43 U/L (ref 0–45)
BILIRUB DIRECT SERPL-MCNC: 0.6 MG/DL (ref 0–0.2)
BILIRUB SERPL-MCNC: 1.3 MG/DL (ref 0.2–1.3)
GLUCOSE BLDC GLUCOMTR-MCNC: 181 MG/DL (ref 70–99)
GLUCOSE BLDC GLUCOMTR-MCNC: 185 MG/DL (ref 70–99)
HGB BLD-MCNC: 13.1 G/DL (ref 13.3–17.7)
PROT SERPL-MCNC: 6.9 G/DL (ref 6.8–8.8)

## 2022-07-06 PROCEDURE — 710N000009 HC RECOVERY PHASE 1, LEVEL 1, PER MIN: Performed by: SURGERY

## 2022-07-06 PROCEDURE — 250N000013 HC RX MED GY IP 250 OP 250 PS 637: Performed by: PHYSICIAN ASSISTANT

## 2022-07-06 PROCEDURE — 47562 LAPAROSCOPIC CHOLECYSTECTOMY: CPT | Mod: 22 | Performed by: SURGERY

## 2022-07-06 PROCEDURE — 360N000076 HC SURGERY LEVEL 3, PER MIN: Performed by: SURGERY

## 2022-07-06 PROCEDURE — 258N000003 HC RX IP 258 OP 636: Performed by: ANESTHESIOLOGY

## 2022-07-06 PROCEDURE — 258N000003 HC RX IP 258 OP 636: Performed by: HOSPITALIST

## 2022-07-06 PROCEDURE — 272N000001 HC OR GENERAL SUPPLY STERILE: Performed by: SURGERY

## 2022-07-06 PROCEDURE — 370N000017 HC ANESTHESIA TECHNICAL FEE, PER MIN: Performed by: SURGERY

## 2022-07-06 PROCEDURE — 258N000003 HC RX IP 258 OP 636: Performed by: PHYSICIAN ASSISTANT

## 2022-07-06 PROCEDURE — 250N000011 HC RX IP 250 OP 636: Performed by: PHYSICIAN ASSISTANT

## 2022-07-06 PROCEDURE — 250N000025 HC SEVOFLURANE, PER MIN: Performed by: SURGERY

## 2022-07-06 PROCEDURE — 80076 HEPATIC FUNCTION PANEL: CPT | Performed by: HOSPITALIST

## 2022-07-06 PROCEDURE — 250N000013 HC RX MED GY IP 250 OP 250 PS 637: Performed by: ANESTHESIOLOGY

## 2022-07-06 PROCEDURE — 250N000011 HC RX IP 250 OP 636: Performed by: REGISTERED NURSE

## 2022-07-06 PROCEDURE — 250N000009 HC RX 250: Performed by: SURGERY

## 2022-07-06 PROCEDURE — 250N000011 HC RX IP 250 OP 636: Performed by: HOSPITALIST

## 2022-07-06 PROCEDURE — 250N000011 HC RX IP 250 OP 636: Performed by: ANESTHESIOLOGY

## 2022-07-06 PROCEDURE — 36415 COLL VENOUS BLD VENIPUNCTURE: CPT | Performed by: HOSPITALIST

## 2022-07-06 PROCEDURE — 120N000001 HC R&B MED SURG/OB

## 2022-07-06 PROCEDURE — 85018 HEMOGLOBIN: CPT | Performed by: HOSPITALIST

## 2022-07-06 PROCEDURE — 250N000009 HC RX 250: Performed by: REGISTERED NURSE

## 2022-07-06 PROCEDURE — 258N000001 HC RX 258: Performed by: SURGERY

## 2022-07-06 PROCEDURE — 88304 TISSUE EXAM BY PATHOLOGIST: CPT | Mod: TC | Performed by: SURGERY

## 2022-07-06 PROCEDURE — 99233 SBSQ HOSP IP/OBS HIGH 50: CPT | Performed by: HOSPITALIST

## 2022-07-06 PROCEDURE — 88304 TISSUE EXAM BY PATHOLOGIST: CPT | Mod: 26 | Performed by: PATHOLOGY

## 2022-07-06 PROCEDURE — 0FT44ZZ RESECTION OF GALLBLADDER, PERCUTANEOUS ENDOSCOPIC APPROACH: ICD-10-PCS | Performed by: SURGERY

## 2022-07-06 PROCEDURE — 999N000141 HC STATISTIC PRE-PROCEDURE NURSING ASSESSMENT: Performed by: SURGERY

## 2022-07-06 RX ORDER — FENTANYL CITRATE 0.05 MG/ML
50 INJECTION, SOLUTION INTRAMUSCULAR; INTRAVENOUS EVERY 5 MIN PRN
Status: DISCONTINUED | OUTPATIENT
Start: 2022-07-06 | End: 2022-07-06 | Stop reason: HOSPADM

## 2022-07-06 RX ORDER — AMOXICILLIN 250 MG
1 CAPSULE ORAL 2 TIMES DAILY
Qty: 15 TABLET | Refills: 0 | Status: SHIPPED | OUTPATIENT
Start: 2022-07-06

## 2022-07-06 RX ORDER — HYDRALAZINE HYDROCHLORIDE 20 MG/ML
2.5-5 INJECTION INTRAMUSCULAR; INTRAVENOUS EVERY 10 MIN PRN
Status: DISCONTINUED | OUTPATIENT
Start: 2022-07-06 | End: 2022-07-06 | Stop reason: HOSPADM

## 2022-07-06 RX ORDER — SODIUM CHLORIDE, SODIUM LACTATE, POTASSIUM CHLORIDE, CALCIUM CHLORIDE 600; 310; 30; 20 MG/100ML; MG/100ML; MG/100ML; MG/100ML
INJECTION, SOLUTION INTRAVENOUS CONTINUOUS
Status: DISCONTINUED | OUTPATIENT
Start: 2022-07-06 | End: 2022-07-06 | Stop reason: HOSPADM

## 2022-07-06 RX ORDER — HYDROCODONE BITARTRATE AND ACETAMINOPHEN 5; 325 MG/1; MG/1
1-2 TABLET ORAL EVERY 4 HOURS PRN
Status: DISCONTINUED | OUTPATIENT
Start: 2022-07-06 | End: 2022-07-07 | Stop reason: HOSPADM

## 2022-07-06 RX ORDER — BUPIVACAINE HYDROCHLORIDE AND EPINEPHRINE 5; 5 MG/ML; UG/ML
INJECTION, SOLUTION PERINEURAL PRN
Status: DISCONTINUED | OUTPATIENT
Start: 2022-07-06 | End: 2022-07-06 | Stop reason: HOSPADM

## 2022-07-06 RX ORDER — LIDOCAINE HYDROCHLORIDE 20 MG/ML
INJECTION, SOLUTION INFILTRATION; PERINEURAL PRN
Status: DISCONTINUED | OUTPATIENT
Start: 2022-07-06 | End: 2022-07-06

## 2022-07-06 RX ORDER — ALBUTEROL SULFATE 0.83 MG/ML
2.5 SOLUTION RESPIRATORY (INHALATION) EVERY 4 HOURS PRN
Status: DISCONTINUED | OUTPATIENT
Start: 2022-07-06 | End: 2022-07-06 | Stop reason: HOSPADM

## 2022-07-06 RX ORDER — SIMETHICONE 80 MG
80 TABLET,CHEWABLE ORAL 4 TIMES DAILY
Status: DISCONTINUED | OUTPATIENT
Start: 2022-07-06 | End: 2022-07-07 | Stop reason: HOSPADM

## 2022-07-06 RX ORDER — PROPOFOL 10 MG/ML
INJECTION, EMULSION INTRAVENOUS PRN
Status: DISCONTINUED | OUTPATIENT
Start: 2022-07-06 | End: 2022-07-06

## 2022-07-06 RX ORDER — DEXAMETHASONE SODIUM PHOSPHATE 4 MG/ML
INJECTION, SOLUTION INTRA-ARTICULAR; INTRALESIONAL; INTRAMUSCULAR; INTRAVENOUS; SOFT TISSUE PRN
Status: DISCONTINUED | OUTPATIENT
Start: 2022-07-06 | End: 2022-07-06

## 2022-07-06 RX ORDER — FENTANYL CITRATE 50 UG/ML
INJECTION, SOLUTION INTRAMUSCULAR; INTRAVENOUS PRN
Status: DISCONTINUED | OUTPATIENT
Start: 2022-07-06 | End: 2022-07-06

## 2022-07-06 RX ORDER — METOPROLOL SUCCINATE 25 MG/1
25 TABLET, EXTENDED RELEASE ORAL DAILY
Status: DISCONTINUED | OUTPATIENT
Start: 2022-07-06 | End: 2022-07-06 | Stop reason: HOSPADM

## 2022-07-06 RX ORDER — MAGNESIUM HYDROXIDE 1200 MG/15ML
LIQUID ORAL PRN
Status: DISCONTINUED | OUTPATIENT
Start: 2022-07-06 | End: 2022-07-06 | Stop reason: HOSPADM

## 2022-07-06 RX ORDER — HYDROCODONE BITARTRATE AND ACETAMINOPHEN 5; 325 MG/1; MG/1
1-2 TABLET ORAL EVERY 4 HOURS PRN
Qty: 12 TABLET | Refills: 0 | Status: SHIPPED | OUTPATIENT
Start: 2022-07-06

## 2022-07-06 RX ORDER — ONDANSETRON 2 MG/ML
4 INJECTION INTRAMUSCULAR; INTRAVENOUS EVERY 30 MIN PRN
Status: DISCONTINUED | OUTPATIENT
Start: 2022-07-06 | End: 2022-07-06 | Stop reason: HOSPADM

## 2022-07-06 RX ORDER — MEPERIDINE HYDROCHLORIDE 25 MG/ML
12.5 INJECTION INTRAMUSCULAR; INTRAVENOUS; SUBCUTANEOUS EVERY 5 MIN PRN
Status: DISCONTINUED | OUTPATIENT
Start: 2022-07-06 | End: 2022-07-06 | Stop reason: HOSPADM

## 2022-07-06 RX ORDER — ONDANSETRON 4 MG/1
4 TABLET, ORALLY DISINTEGRATING ORAL EVERY 30 MIN PRN
Status: DISCONTINUED | OUTPATIENT
Start: 2022-07-06 | End: 2022-07-06 | Stop reason: HOSPADM

## 2022-07-06 RX ORDER — LABETALOL HYDROCHLORIDE 5 MG/ML
10 INJECTION, SOLUTION INTRAVENOUS
Status: DISCONTINUED | OUTPATIENT
Start: 2022-07-06 | End: 2022-07-06 | Stop reason: HOSPADM

## 2022-07-06 RX ORDER — AMOXICILLIN 250 MG
1 CAPSULE ORAL 2 TIMES DAILY
Status: DISCONTINUED | OUTPATIENT
Start: 2022-07-06 | End: 2022-07-07 | Stop reason: HOSPADM

## 2022-07-06 RX ORDER — LABETALOL 20 MG/4 ML (5 MG/ML) INTRAVENOUS SYRINGE
PRN
Status: DISCONTINUED | OUTPATIENT
Start: 2022-07-06 | End: 2022-07-06

## 2022-07-06 RX ORDER — HYDROMORPHONE HCL IN WATER/PF 6 MG/30 ML
0.4 PATIENT CONTROLLED ANALGESIA SYRINGE INTRAVENOUS EVERY 5 MIN PRN
Status: DISCONTINUED | OUTPATIENT
Start: 2022-07-06 | End: 2022-07-06 | Stop reason: HOSPADM

## 2022-07-06 RX ORDER — OXYCODONE HYDROCHLORIDE 5 MG/1
5 TABLET ORAL EVERY 4 HOURS PRN
Status: DISCONTINUED | OUTPATIENT
Start: 2022-07-06 | End: 2022-07-06 | Stop reason: HOSPADM

## 2022-07-06 RX ORDER — NEOSTIGMINE METHYLSULFATE 1 MG/ML
VIAL (ML) INJECTION PRN
Status: DISCONTINUED | OUTPATIENT
Start: 2022-07-06 | End: 2022-07-06

## 2022-07-06 RX ORDER — HYDROMORPHONE HYDROCHLORIDE 1 MG/ML
0.3 INJECTION, SOLUTION INTRAMUSCULAR; INTRAVENOUS; SUBCUTANEOUS
Status: DISCONTINUED | OUTPATIENT
Start: 2022-07-06 | End: 2022-07-07 | Stop reason: HOSPADM

## 2022-07-06 RX ORDER — GLYCOPYRROLATE 0.2 MG/ML
INJECTION, SOLUTION INTRAMUSCULAR; INTRAVENOUS PRN
Status: DISCONTINUED | OUTPATIENT
Start: 2022-07-06 | End: 2022-07-06

## 2022-07-06 RX ORDER — ONDANSETRON 2 MG/ML
INJECTION INTRAMUSCULAR; INTRAVENOUS PRN
Status: DISCONTINUED | OUTPATIENT
Start: 2022-07-06 | End: 2022-07-06

## 2022-07-06 RX ADMIN — SODIUM CHLORIDE: 9 INJECTION, SOLUTION INTRAVENOUS at 03:07

## 2022-07-06 RX ADMIN — SODIUM CHLORIDE, POTASSIUM CHLORIDE, SODIUM LACTATE AND CALCIUM CHLORIDE: 600; 310; 30; 20 INJECTION, SOLUTION INTRAVENOUS at 08:41

## 2022-07-06 RX ADMIN — HYDROMORPHONE HYDROCHLORIDE 0.4 MG: 0.2 INJECTION, SOLUTION INTRAMUSCULAR; INTRAVENOUS; SUBCUTANEOUS at 09:33

## 2022-07-06 RX ADMIN — LIDOCAINE HYDROCHLORIDE 100 MG: 20 INJECTION, SOLUTION INFILTRATION; PERINEURAL at 07:38

## 2022-07-06 RX ADMIN — HYDROCODONE BITARTRATE AND ACETAMINOPHEN 1 TABLET: 5; 325 TABLET ORAL at 15:23

## 2022-07-06 RX ADMIN — PIPERACILLIN AND TAZOBACTAM 4.5 G: 4; .5 INJECTION, POWDER, FOR SOLUTION INTRAVENOUS at 21:49

## 2022-07-06 RX ADMIN — ONDANSETRON 4 MG: 2 INJECTION INTRAMUSCULAR; INTRAVENOUS at 07:44

## 2022-07-06 RX ADMIN — FENTANYL CITRATE 100 MCG: 50 INJECTION, SOLUTION INTRAMUSCULAR; INTRAVENOUS at 07:38

## 2022-07-06 RX ADMIN — ROCURONIUM BROMIDE 5 MG: 50 INJECTION, SOLUTION INTRAVENOUS at 07:38

## 2022-07-06 RX ADMIN — SODIUM CHLORIDE, POTASSIUM CHLORIDE, SODIUM LACTATE AND CALCIUM CHLORIDE: 600; 310; 30; 20 INJECTION, SOLUTION INTRAVENOUS at 07:08

## 2022-07-06 RX ADMIN — HYDROMORPHONE HYDROCHLORIDE 0.4 MG: 0.2 INJECTION, SOLUTION INTRAMUSCULAR; INTRAVENOUS; SUBCUTANEOUS at 09:46

## 2022-07-06 RX ADMIN — PROPOFOL 250 MG: 10 INJECTION, EMULSION INTRAVENOUS at 07:38

## 2022-07-06 RX ADMIN — SIMETHICONE 80 MG: 80 TABLET, CHEWABLE ORAL at 21:49

## 2022-07-06 RX ADMIN — HYDROCODONE BITARTRATE AND ACETAMINOPHEN 1 TABLET: 5; 325 TABLET ORAL at 21:49

## 2022-07-06 RX ADMIN — PIPERACILLIN AND TAZOBACTAM 4.5 G: 4; .5 INJECTION, POWDER, FOR SOLUTION INTRAVENOUS at 03:07

## 2022-07-06 RX ADMIN — SUCCINYLCHOLINE CHLORIDE 140 MG: 20 INJECTION, SOLUTION INTRAMUSCULAR; INTRAVENOUS; PARENTERAL at 07:38

## 2022-07-06 RX ADMIN — METOPROLOL SUCCINATE 25 MG: 25 TABLET, EXTENDED RELEASE ORAL at 07:08

## 2022-07-06 RX ADMIN — HYDROMORPHONE HYDROCHLORIDE 0.4 MG: 0.2 INJECTION, SOLUTION INTRAMUSCULAR; INTRAVENOUS; SUBCUTANEOUS at 09:17

## 2022-07-06 RX ADMIN — HYDROMORPHONE HYDROCHLORIDE 0.4 MG: 0.2 INJECTION, SOLUTION INTRAMUSCULAR; INTRAVENOUS; SUBCUTANEOUS at 10:15

## 2022-07-06 RX ADMIN — PIPERACILLIN AND TAZOBACTAM 4.5 G: 4; .5 INJECTION, POWDER, FOR SOLUTION INTRAVENOUS at 08:50

## 2022-07-06 RX ADMIN — LABETALOL 20 MG/4 ML (5 MG/ML) INTRAVENOUS SYRINGE 10 MG: at 08:14

## 2022-07-06 RX ADMIN — SIMETHICONE 80 MG: 80 TABLET, CHEWABLE ORAL at 18:03

## 2022-07-06 RX ADMIN — SIMETHICONE 80 MG: 80 TABLET, CHEWABLE ORAL at 13:01

## 2022-07-06 RX ADMIN — MIDAZOLAM 2 MG: 1 INJECTION INTRAMUSCULAR; INTRAVENOUS at 07:29

## 2022-07-06 RX ADMIN — SENNOSIDES AND DOCUSATE SODIUM 1 TABLET: 50; 8.6 TABLET ORAL at 21:49

## 2022-07-06 RX ADMIN — ROCURONIUM BROMIDE 10 MG: 50 INJECTION, SOLUTION INTRAVENOUS at 08:16

## 2022-07-06 RX ADMIN — DEXAMETHASONE SODIUM PHOSPHATE 4 MG: 4 INJECTION, SOLUTION INTRA-ARTICULAR; INTRALESIONAL; INTRAMUSCULAR; INTRAVENOUS; SOFT TISSUE at 07:44

## 2022-07-06 RX ADMIN — SODIUM CHLORIDE: 9 INJECTION, SOLUTION INTRAVENOUS at 18:04

## 2022-07-06 RX ADMIN — NEOSTIGMINE METHYLSULFATE 5 MG: 1 INJECTION, SOLUTION INTRAVENOUS at 09:00

## 2022-07-06 RX ADMIN — PIPERACILLIN AND TAZOBACTAM 4.5 G: 4; .5 INJECTION, POWDER, FOR SOLUTION INTRAVENOUS at 15:23

## 2022-07-06 RX ADMIN — PROPOFOL 50 MG: 10 INJECTION, EMULSION INTRAVENOUS at 08:19

## 2022-07-06 RX ADMIN — ROCURONIUM BROMIDE 45 MG: 50 INJECTION, SOLUTION INTRAVENOUS at 07:45

## 2022-07-06 RX ADMIN — GLYCOPYRROLATE 0.8 MG: 0.2 INJECTION, SOLUTION INTRAMUSCULAR; INTRAVENOUS at 09:00

## 2022-07-06 ASSESSMENT — ACTIVITIES OF DAILY LIVING (ADL)
ADLS_ACUITY_SCORE: 34

## 2022-07-06 NOTE — DISCHARGE INSTRUCTIONS
[facility]  Discharge Instructions: Post-Operative Laparoscopic Cholecystectomy    ACTIVITY  Expect to feel tired after your surgery.  This will gradually resolve.    Take frequent, short walks and increase your activity gradually.    Avoid strenuous physical activity or heavy lifting greater than 15-20 lbs. for 2-3 weeks.  You may climb stairs.  You may drive without restrictions when you are not using any prescription pain medication and feel comfortable in a car.  You may return to work/school when you are comfortable without any prescription pain medication.    WOUND CARE  You may remove your outer dressing or Band-Aids and shower 48 hours after the surgery.  Pat your incisions dry and leave them open to air.  Re-apply dressing (Band-Aids or gauze/tape) as needed for comfort or drainage.  You may have steri-strips (looks like white tape) on your incision.  You may peel off the steri-strips 2 weeks after your surgery if they have not peeled off on their own.   Do not soak your incisions in a tub or pool for 2 weeks.   Do not apply any lotions, creams, or ointments to your incisions.  A ridge under your incisions is normal and will gradually resolve.    DIET  Start with liquids, then gradually resume your regular diet as tolerated.  Avoid heavy, spicy, and greasy meals for 2-3 days.  Drink plenty of fluids to stay hydrated.  It is not uncommon to experience some loose stools or diarrhea after surgery.  This is your body's way of adapting to the bile which will slowly drain into your intestine.  A low fat diet may help with this.  This should improve over 1-2 months.    PAIN  Expect some tenderness and discomfort at the incision sites.  Use the prescribed pain medication at your discretion.  Expect gradual resolution of your pain over several days.  You may take ibuprofen with food (unless you have been told not to) or acetaminophen/Tylenol instead of or in addition to your prescribed  pain medication.  However, if you are taking Norco or Percocet, do not take any additional acetaminophen/Tylenol.  Do not drink alcohol or drive while you are taking pain medications.  You may apply ice to your incisions in 20 minute intervals as needed for the next 48 hours.  After that time, consider switching to heat if you prefer.    EXPECTATIONS  Pain medications can cause constipation.  Limit use when possible.  Take an over the counter or prescribed stool softener/stimulant, such as Colace or Senna, 1-2 times a day with plenty of water.  You may take a mild over the counter laxative, such as Miralax or a suppository, as needed.  You may discontinue these medications once you are having regular bowel movements and/or are no longer taking your narcotic pain medication.    You may have shoulder or upper back discomfort due to the gas used in surgery.  This is temporary and should resolve in 48-72 hours.  Short, frequent walks may help with this.  If you are unable to urinate for 8 hours or feel as though you are not emptying your bladder adequately, we recommend you seek care at an ER or Urgent Care facility for possible catheter placement.     FOLLOW UP  Our office will contact you in approximately 2-3 weeks to check on your progress and answer any questions you may have.  If you are doing well, you will not need to return for a follow up appointment.  If any concerns are identified over the phone, we will help you make an appointment to see a provider.   If you have not received a phone call, have any questions or concerns, or would like to be seen, please call us at [phone] and ask to speak with our nurse.  We are located at [address] [place] [address].    CALL OUR OFFICE -[phone] IF YOU HAVE:   Chills or fever above 101 F.  Increased redness, warmth, or drainage at your incisions.  Significant bleeding.  Pain not relieved by your pain medication or rest.  Increasing pain  after the first 48 hours.  Any other concerns or questions.                      Revised December 2021

## 2022-07-06 NOTE — PROGRESS NOTES
[facility]    Medicine Progress Note - Hospitalist Service    Date of Admission:  7/3/2022    Assessment & Plan          Meek Anderson is a 47 year old male admitted on 7/3/2022 who was directly admitted due to obstructive choledocholithiasis with acute cholecystitis after he  presented to [facility] ED due to generalized weakness.       Obstructive choledocholithiasis with acute cholecystitis  Transaminitis  *  Limited abdominal ultrasound was positive for early cholecystitis with noted few tiny gallstones and sludge present.  * labs revealed alk phosphatase of 314, ALT of 159, AST of 107, direct bilirubin of 5.3, total bilirubin of 7.3 . Labs now trending down.  * started on unasyn in previous facility  Plan  - [facility] consulted- input appreciated. S/p EUS 07/5/22 which was negative for biliary stone.   - general surgery consulted -input appreciated. S/p cholecystectomy today.  - started on a diet with plans to advance as tolerated  - continue IV zosyn started on admission,   - IV Fluids at 100 ml/hr.    - PRN Analgesics available.    - PRN antiemetics available.      DIONY - resolved  -- Cr 1.47 on admission  -- Likely prerenal from vomiting.  --Resolved with fluids.       Hyponatremia -resolved  -- Likely due to vomiting  --Resolved with IV fluids     Hypokalemia  - resolved  --Likely due to vomiting     Abnormal UA  - urine cultures - NGTD    Possible syncope  -- patient reports losing consciousness while at the cafe along with lightheadedness and sweating  -- likely vasovagal syncope especially in the setting constant vomiting   -- less likely cardiac and less likely seizures  -- will monitor for recurrence of symptoms and will monitor on tele.      Obesity   There is no height or weight on file to calculate BMI.  Increase in all-cause morbidity and mortality.   - Encourage weight loss.  - Follow up with PCP regarding ongoing management.       HTN  - Hold PTA Prinzide 20-25  "mg/d.    - Resumed on PTA Toprol XL 25 mg/d.  Hold parameters in place.       HLP  - Hold PTA atorvastatin 40 mg/d due to elevated LFTs.       DM2  - A1c - pending  - Hold PTA metformin 1000 mg BID and weekly subcutaneous Trulicity injections.    - NPO.    - Sliding scale insulin.    - Glucose checks.    - Hypoglycemic protocol.       Gout  - Hold PTA PRN colchicine.           Diet: Moderate Consistent Carb (60 g CHO per Meal) Diet    DVT Prophylaxis: Pneumatic Compression Devices  Warner Catheter: Not present  Central Lines: None  Cardiac Monitoring: ACTIVE order. Indication: Syncope- low cardiac risk (24 hours)  Code Status: Full Code      Disposition Plan      Expected Discharge Date: 07/07/2022                The patient's care was discussed with the Patient.    Darcy Hewitt MD  Hospitalist Service  [facility]  Securely message with the [employer]oc[employer] Web Console (learn more here)  Text page via [employer] Paging/Directory         Clinically Significant Risk Factors Present on Admission                # Hypertension: home medication list includes antihypertensive(s)  # Severe Obesity: Estimated body mass index is 47.98 kg/m  as calculated from the following:    Height as of this encounter: 1.803 m (5' 11\").    Weight as of this encounter: 156 kg (344 lb).        ______________________________________________________________________    Interval History   Patient seen after cholecystectomy today.  He notes soreness in his lower abdomen however he was able to tolerate a clear diet.  Continue to monitor for now, possible discharge today if cleared by surgical team if not we will discharge tomorrow.    Data reviewed today: I reviewed all medications, new labs and imaging results over the last 24 hours. I personally reviewed no images or EKG's today.    Physical Exam   Vital Signs: Temp: 98.1  F (36.7  C) Temp src: Axillary BP: (!) 139/91 Pulse: 86   Resp: 15 SpO2: 98 % O2 Device: None (Room air) Oxygen " Delivery: 2 LPM  Weight: 344 lbs 0 oz  General Appearance: Well appearing for stated age.  Respiratory: CTAB, no rales or ronchi  Cardiovascular: S1, S2 normal, no murmurs  GI: non-tender on palpation, BS present      Data   Recent Labs   Lab 07/06/22  0930 07/06/22  0559 07/05/22  1741 07/05/22  1504 07/05/22  0711 07/04/22  0453 07/03/22  2132 07/03/22  1146   WBC  --   --   --   --   --  5.7  --  8.2   HGB  --   --   --   --   --  12.7*  --  15.3   MCV  --   --   --   --   --  81  --  80   PLT  --   --   --   --   --  190  --  234   INR  --   --   --   --   --   --   --  1.23*   NA  --   --   --   --  139 138 137 132*   POTASSIUM  --   --   --   --  3.6 3.6  3.6 3.1* 3.1*   CHLORIDE  --   --   --   --  109 106 104 95   CO2  --   --   --   --  23 24 23 25   BUN  --   --   --   --  13 18 20 23   CR  --   --   --   --  0.76 1.05 1.02 1.47*   ANIONGAP  --   --   --   --  7 8 10 12   LOIS  --   --   --   --  9.3 8.8 9.1 9.9   * 181* 131*   < > 169* 164* 163* 215*   ALBUMIN  --   --   --   --  2.5* 2.4*  --  3.0*   PROTTOTAL  --   --   --   --  6.7* 6.5*  --  7.6   BILITOTAL  --   --   --   --  1.9* 3.5*  --  7.3*   ALKPHOS  --   --   --   --  264* 258*  --  314*   ALT  --   --   --   --  92* 117*  --  159*   AST  --   --   --   --  39 71*  --  107*   LIPASE  --   --   --   --   --   --   --  103    < > = values in this interval not displayed.     No results found for this or any previous visit (from the past 24 hour(s)).

## 2022-07-06 NOTE — ANESTHESIA CARE TRANSFER NOTE
Patient: Meek Anderson    Procedure: Procedure(s):  LAPAROSCOPIC CHOLECYSTECTOMY       Diagnosis: Cholecystitis [K81.9]  Diagnosis Additional Information: No value filed.    Anesthesia Type:   General     Note:    Oropharynx: oropharynx clear of all foreign objects  Level of Consciousness: awake  Oxygen Supplementation: face mask  Level of Supplemental Oxygen (L/min / FiO2): 6  Independent Airway: airway patency satisfactory and stable  Dentition: dentition unchanged  Vital Signs Stable: post-procedure vital signs reviewed and stable  Report to RN Given: handoff report given  Patient transferred to: PACU  Comments: Patient comfortable  Handoff Report: Identifed the Patient, Identified the Reponsible Provider, Reviewed the pertinent medical history, Discussed the surgical course, Reviewed Intra-OP anesthesia mangement and issues during anesthesia, Set expectations for post-procedure period and Allowed opportunity for questions and acknowledgement of understanding      Vitals:  Vitals Value Taken Time   BP     Temp     Pulse 66 07/06/22 0912   Resp 6 07/06/22 0912   SpO2 97 % 07/06/22 0912   Vitals shown include unvalidated device data.    Electronically Signed By: NORMA Barr CRNA  July 6, 2022  9:14 AM

## 2022-07-06 NOTE — OR NURSING
RN-to RN report given to Joelle on Gen surg. Report included Pt's consistent elevated BPs. Pt stated he takes BP meds at home. Recommended RN to contact house MD on floor to order home meds to lower BP.

## 2022-07-06 NOTE — ANESTHESIA PREPROCEDURE EVALUATION
Anesthesia Pre-Procedure Evaluation    Patient: Meek Anderson   MRN: [id number] : 1975        Procedure : Procedure(s):  LAPAROSCOPIC CHOLECYSTECTOMY          Past Medical History:   Diagnosis Date     Diabetes (H)      Obese       Past Surgical History:   Procedure Laterality Date     ENT SURGERY        Allergies   Allergen Reactions     Alcohol       Social History     Tobacco Use     Smoking status: Never Smoker     Smokeless tobacco: Never Used   Substance Use Topics     Alcohol use: Never      Wt Readings from Last 1 Encounters:   22 (!) 156 kg (344 lb)        Anesthesia Evaluation   Pt has had prior anesthetic.     No history of anesthetic complications       ROS/MED HX  ENT/Pulmonary:    (-) sleep apnea   Neurologic:    (-) no CVA   Cardiovascular:     (+) hypertension----- (-) CAD   METS/Exercise Tolerance:     Hematologic:       Musculoskeletal:       GI/Hepatic:     (+) cholecystitis/cholelithiasis,  (-) GERD   Renal/Genitourinary:       Endo:     (+) type II DM, Obesity,     Psychiatric/Substance Use:       Infectious Disease:       Malignancy:       Other:            Physical Exam    Airway        Mallampati: III   TM distance: > 3 FB   Neck ROM: full   Mouth opening: > 3 cm    Respiratory Devices and Support         Dental  no notable dental history         Cardiovascular   cardiovascular exam normal          Pulmonary   pulmonary exam normal                OUTSIDE LABS:  CBC:   Lab Results   Component Value Date    WBC 5.7 2022    WBC 8.2 2022    HGB 12.7 (L) 2022    HGB 15.3 2022    HCT 36.7 (L) 2022    HCT 42.8 2022     2022     2022     BMP:   Lab Results   Component Value Date     2022     2022    POTASSIUM 3.6 2022    POTASSIUM 3.6 2022    POTASSIUM 3.6 2022    CHLORIDE 109 2022    CHLORIDE 106 2022    CO2 23 2022    CO2 24 2022    BUN 13  07/05/2022    BUN 18 07/04/2022    CR 0.76 07/05/2022    CR 1.05 07/04/2022     (H) 07/05/2022     (H) 07/05/2022     COAGS:   Lab Results   Component Value Date    INR 1.23 (H) 07/03/2022     POC: No results found for: BGM, HCG, HCGS  HEPATIC:   Lab Results   Component Value Date    ALBUMIN 2.5 (L) 07/05/2022    PROTTOTAL 6.7 (L) 07/05/2022    ALT 92 (H) 07/05/2022    AST 39 07/05/2022    ALKPHOS 264 (H) 07/05/2022    BILITOTAL 1.9 (H) 07/05/2022     OTHER:   Lab Results   Component Value Date    LACT 1.5 07/03/2022    A1C 6.9 (H) 07/04/2022    LOIS 9.3 07/05/2022    LIPASE 103 07/03/2022       Anesthesia Plan    ASA Status:  2   NPO Status:  NPO Appropriate    Anesthesia Type: General.     - Airway: ETT   Induction: Propofol, Intravenous, RSI.   Maintenance: Balanced.   Techniques and Equipment:     - Airway: Video-Laryngoscope         Consents    Anesthesia Plan(s) and associated risks, benefits, and realistic alternatives discussed. Questions answered and patient/representative(s) expressed understanding.    - Discussed:     - Discussed with:  Patient         Postoperative Care    Pain management: Multi-modal analgesia.   PONV prophylaxis: Ondansetron (or other 5HT-3), Dexamethasone or Solumedrol     Comments:                Marbella Castillo MD, MD

## 2022-07-06 NOTE — OP NOTE
General Surgery Operative Note    PREOPERATIVE DIAGNOSIS:  Cholecystitis [K81.9]    POSTOPERATIVE DIAGNOSIS: Acute cholecystitis, hydrops gallbladder    PROCEDURE:   Procedure(s):  LAPAROSCOPIC CHOLECYSTECTOMY    Difficulty: Extremely difficult with a 22 modifier owing to acute infection of the gallbladder and morbid obesity, making visualization difficult    ANESTHESIA:  General.    PREOPERATIVE MEDICATIONS: Zosyn IV.    SURGEON:  Cecil Hammonds MD    ASSISTANT:  Montserrat Alatorre PA-C  A first assistant was necessary owing to challenging laparoscopic visualization and exposure.  Retraction was also necessary.    INDICATIONS: Patient presented with acute abdominal pain and some disturbance in his LFTs.  Endoscopic ultrasound did not reveal biliary obstruction.  He now presents for cholecystectomy.    PROCEDURE:  The patient was taken to the operating suite and uneventfully endotracheally intubated.  The abdomen was prepped and draped in a sterile fashion.  Surgeon initiated timeout was acknowledged.  We entered the abdomen in the left upper quadrant using Visiport technique.  Three other trocars were placed under laparoscopic visualization.  We elevated the liver and were able to identify a somewhat inflamed gallbladder.  The gallbladder was grasped and used to elevate the liver further.  We began dissecting out some fatty adhesions down near the neck of the gallbladder until a cystic duct was encountered.  We continued our dissection using combination of sharp and blunt dissection until the cystic duct was largely dissected out.  We continued our dissection up along the sides of the gallbladder, both medially and laterally, until we had created a space between the gallbladder and the liver.  At this point, we encountered the cystic artery, just posterior and lateral to the cystic duct.  This again was dissected out.  Once we had created a window where only the cystic artery and duct were noted to be entering the  gallbladder, we felt that this represented our critical view.  The cystic artery and duct were then doubly clipped and divided.  We continued our dissection up along the body of the gallbladder, freeing all attachments and adhesions of the gallbladder to the liver.  Gallbladder was removed from the liver in an atraumatic fashion.  The gallbladder was then brought up through the umbilical port site and removed from the abdomen.  The gallbladder fossa was reinspected, and all areas of bleeding were managed with electrocautery.  We irrigated the area with normal saline and aspirated it out.  We then removed the umbilical port trocar and closed the fascia with a figure-of-eight 0 Vicryl suture.  This was done using the Sonido-Kiko device.  We then reinspected the abdomen, and everything appeared to be in pristine condition.  We removed the trocars under laparoscopic visualization and desufflated the abdomen with the Paul suction .  The skin edges were reapproximated with 4-0 Vicryl and Steri-Strips.  The patient was uneventfully extubated, awakened and taken to the PACU in stable condition.  At the conclusion of the case, all lap and needle counts were correct.      ESTIMATED BLOOD LOSS: 30 mL  INTRAOPERATIVE FINDINGS: Acute cholecystitis, clear mucoid fluid within the gallbladder consistent with hydrops gallbladder.  Difficult visualization owing to body habitus.    Cecil Hammonds MD, MD

## 2022-07-06 NOTE — ANESTHESIA POSTPROCEDURE EVALUATION
Patient: Meek Anderson    Procedure: Procedure(s):  LAPAROSCOPIC CHOLECYSTECTOMY       Anesthesia Type:  General    Note:     Postop Pain Control: Uneventful            Sign Out: Well controlled pain   PONV: No   Neuro/Psych: Uneventful            Sign Out: Acceptable/Baseline neuro status   Airway/Respiratory: Uneventful            Sign Out: Acceptable/Baseline resp. status   CV/Hemodynamics: Uneventful            Sign Out: Acceptable CV status; No obvious hypovolemia; No obvious fluid overload   Other NRE: NONE   DID A NON-ROUTINE EVENT OCCUR? No           Last vitals:  Vitals Value Taken Time   /105 07/06/22 1015   Temp 36.3  C (97.3  F) 07/06/22 1000   Pulse 58 07/06/22 1024   Resp 24 07/06/22 1024   SpO2 97 % 07/06/22 1027   Vitals shown include unvalidated device data.    Electronically Signed By: Marbella Castillo MD, MD  July 6, 2022  12:17 PM

## 2022-07-06 NOTE — ANESTHESIA PROCEDURE NOTES
Airway       Patient location during procedure: OR       Procedure Start/Stop Times: 7/6/2022 7:40 AM  Staff -        CRNA: Jackie Alcocer APRN CRNA       Performed By: CRNA  Consent for Airway        Urgency: elective  Indications and Patient Condition       Indications for airway management: ana-procedural       Induction type:intravenous       Mask difficulty assessment: 1 - vent by mask    Final Airway Details       Final airway type: endotracheal airway       Successful airway: ETT - single  Endotracheal Airway Details        ETT size (mm): 8.0       Cuffed: yes       Successful intubation technique: video laryngoscopy       VL Blade Size: Thompson 4       Grade View of Cords: 1       Adjucts: stylet       Position: Right       Measured from: gums/teeth       Secured at (cm): 21       Bite block used: None    Post intubation assessment        Placement verified by: capnometry, equal breath sounds and chest rise        Number of attempts at approach: 1       Number of other approaches attempted: 0       Secured with: pink tape       Ease of procedure: easy       Dentition: Intact and Unchanged    Medication(s) Administered   Medication Administration Time: 7/6/2022 7:40 AM

## 2022-07-06 NOTE — PLAN OF CARE
[id number]. A&Ox4, VSS ex HTN on RA. Patient denies pain and denies nausea. Up with SBA in the room. Patient voiding in urinal during the night. PIV infusing at 100ml/hr of NS with Inttermittent abx. Patient NPO since midnight for lap lisandro this AM. Continue to monitor.

## 2022-07-06 NOTE — PROGRESS NOTES
GI    Neg EUS yesterday for biliary stone  Alondra today  Will sign off.    Please call with questions.    Homer Nassar DO   [facility]  Cell [phone]

## 2022-07-06 NOTE — BRIEF OP NOTE
[facility]  General Surgery Brief Operative Note    Pre-operative diagnosis: Cholecystitis [K81.9]   Post-operative diagnosis Same   Procedure: Procedure(s):  LAPAROSCOPIC CHOLECYSTECTOMY    Surgeon(s), Assistant(s): Surgeon(s) and Role:     * Cecil Hammonds MD - Primary     * Montserrat Alatorre PA-C   Estimated blood loss: See op note   Drains: None   Specimens: ID Type Source Tests Collected by Time Destination   1 :  Tissue Gallbladder SURGICAL PATHOLOGY EXAM Cecil Hammonds MD 7/6/2022  8:37 AM       Findings: Evidence of infection: organ space infection. Inflamed and thickened gallbladder wall.   Complications:  Condition: None  Stable   Comments:      Montserrat Alatorre PA-C  [facility]         See dictated operative report for full details

## 2022-07-07 VITALS
TEMPERATURE: 99 F | OXYGEN SATURATION: 98 % | DIASTOLIC BLOOD PRESSURE: 105 MMHG | HEART RATE: 72 BPM | WEIGHT: 315 LBS | BODY MASS INDEX: 44.1 KG/M2 | SYSTOLIC BLOOD PRESSURE: 169 MMHG | HEIGHT: 71 IN | RESPIRATION RATE: 18 BRPM

## 2022-07-07 LAB
ALBUMIN SERPL-MCNC: 2.6 G/DL (ref 3.4–5)
ALP SERPL-CCNC: 203 U/L (ref 40–150)
ALT SERPL W P-5'-P-CCNC: 73 U/L (ref 0–70)
AST SERPL W P-5'-P-CCNC: 23 U/L (ref 0–45)
BILIRUB DIRECT SERPL-MCNC: 0.5 MG/DL (ref 0–0.2)
BILIRUB SERPL-MCNC: 1.2 MG/DL (ref 0.2–1.3)
PATH REPORT.COMMENTS IMP SPEC: NORMAL
PATH REPORT.COMMENTS IMP SPEC: NORMAL
PATH REPORT.FINAL DX SPEC: NORMAL
PATH REPORT.GROSS SPEC: NORMAL
PATH REPORT.MICROSCOPIC SPEC OTHER STN: NORMAL
PATH REPORT.RELEVANT HX SPEC: NORMAL
PHOTO IMAGE: NORMAL
PROT SERPL-MCNC: 6.5 G/DL (ref 6.8–8.8)

## 2022-07-07 PROCEDURE — 250N000013 HC RX MED GY IP 250 OP 250 PS 637: Performed by: PHYSICIAN ASSISTANT

## 2022-07-07 PROCEDURE — 99239 HOSP IP/OBS DSCHRG MGMT >30: CPT | Performed by: INTERNAL MEDICINE

## 2022-07-07 PROCEDURE — 36415 COLL VENOUS BLD VENIPUNCTURE: CPT | Performed by: PHYSICIAN ASSISTANT

## 2022-07-07 PROCEDURE — 250N000013 HC RX MED GY IP 250 OP 250 PS 637: Performed by: INTERNAL MEDICINE

## 2022-07-07 PROCEDURE — 250N000011 HC RX IP 250 OP 636: Performed by: PHYSICIAN ASSISTANT

## 2022-07-07 PROCEDURE — 258N000003 HC RX IP 258 OP 636: Performed by: PHYSICIAN ASSISTANT

## 2022-07-07 PROCEDURE — 82040 ASSAY OF SERUM ALBUMIN: CPT | Performed by: PHYSICIAN ASSISTANT

## 2022-07-07 RX ORDER — ATORVASTATIN CALCIUM 40 MG/1
40 TABLET, FILM COATED ORAL DAILY
Status: DISCONTINUED | OUTPATIENT
Start: 2022-07-07 | End: 2022-07-07 | Stop reason: HOSPADM

## 2022-07-07 RX ORDER — METOPROLOL SUCCINATE 25 MG/1
25 TABLET, EXTENDED RELEASE ORAL DAILY
Status: DISCONTINUED | OUTPATIENT
Start: 2022-07-07 | End: 2022-07-07 | Stop reason: HOSPADM

## 2022-07-07 RX ORDER — LISINOPRIL AND HYDROCHLOROTHIAZIDE 20; 25 MG/1; MG/1
1 TABLET ORAL DAILY
Status: DISCONTINUED | OUTPATIENT
Start: 2022-07-07 | End: 2022-07-07 | Stop reason: HOSPADM

## 2022-07-07 RX ADMIN — SODIUM CHLORIDE: 9 INJECTION, SOLUTION INTRAVENOUS at 04:04

## 2022-07-07 RX ADMIN — LISINOPRIL AND HYDROCHLOROTHIAZIDE 1 TABLET: 25; 20 TABLET ORAL at 11:45

## 2022-07-07 RX ADMIN — ATORVASTATIN CALCIUM 40 MG: 40 TABLET, FILM COATED ORAL at 10:30

## 2022-07-07 RX ADMIN — HYDROCODONE BITARTRATE AND ACETAMINOPHEN 1 TABLET: 5; 325 TABLET ORAL at 05:30

## 2022-07-07 RX ADMIN — PIPERACILLIN AND TAZOBACTAM 4.5 G: 4; .5 INJECTION, POWDER, FOR SOLUTION INTRAVENOUS at 09:15

## 2022-07-07 RX ADMIN — METOPROLOL SUCCINATE 25 MG: 25 TABLET, EXTENDED RELEASE ORAL at 10:30

## 2022-07-07 RX ADMIN — SIMETHICONE 80 MG: 80 TABLET, CHEWABLE ORAL at 09:15

## 2022-07-07 RX ADMIN — PIPERACILLIN AND TAZOBACTAM 4.5 G: 4; .5 INJECTION, POWDER, FOR SOLUTION INTRAVENOUS at 04:04

## 2022-07-07 RX ADMIN — HYDROCODONE BITARTRATE AND ACETAMINOPHEN 1 TABLET: 5; 325 TABLET ORAL at 10:30

## 2022-07-07 ASSESSMENT — ACTIVITIES OF DAILY LIVING (ADL)
ADLS_ACUITY_SCORE: 34

## 2022-07-07 NOTE — PLAN OF CARE
Goal Outcome Evaluation:      A&O x4. VSS on RA. PRN Norco x1 for abdominal pain at lap sites.  Lap sites (4) with liquid bandage, CDI. Ice packs also helpful for pain control.  Voiding regularly in the bathroom. BM x1 this shift.  Tele NSR. Tolerating moderate carb diet. Up independent in the room. Continue to monitor, likely discharge today.

## 2022-07-07 NOTE — PROGRESS NOTES
"[facility]  GENERAL SURGERY Progress Note    Admission Date: 7/3/2022  2022         Assessment and Plan:     Meek Anderson is a 47 year old male with acute cholecystitis, s/p lap lisandro, POD 1.  - ADAT  - Pain controlled  - No further antibiotics   - LFTs trending down, recommend recheck in 1 week   - Ambulate 4x day and encourage IS  - Medical mngt per hospitalist, appreciate your assistance  - Ok to discharge from surgical standpoint when medically able  - Discharge instructions/RX in chart and discussed             Interval History:     Hypertensive, sore at incisions but otherwise no complaints, pain controlled, tolerating food, ambulating, urinating without difficulty. Meds reviewed.                      Physical Exam:   Blood pressure (!) 172/110, pulse 72, temperature 99  F (37.2  C), temperature source Oral, resp. rate 18, height 1.803 m (5' 11\"), weight (!) 156 kg (344 lb), SpO2 98 %.  Temperature Temp  Av.6  F (37  C)  Min: 98.1  F (36.7  C)  Max: 99  F (37.2  C)   I/O last 3 completed shifts:  In: 4346 [I.V.:4346]  Out: 1300 [Urine:1300]  Constitutional:  Awake and in no apparent distress.   Abdomen: Soft, non-distended, appropriately tender at incision(s).   Wounds: Clean, dry, and intact. Steri strips in place. No erythema or drainage.    Extremities: No edema or calf tenderness.          Data:     Recent Labs   Lab Test 22  0730 22  1522 22  0711 22  0453 22  1146   BILITOTAL 1.2 1.3 1.9*   < > 7.3*   DBIL 0.5* 0.6* 0.8*   < > 5.3*   ALT 73* 88* 92*   < > 159*   AST 23 43 39   < > 107*   ALKPHOS 203* 239* 264*   < > 314*   LIPASE  --   --   --   --  103    < > = values in this interval not displayed.     Montserrat Alatorre PA-C  [facility]  [phone]  "

## 2022-07-07 NOTE — DISCHARGE SUMMARY
[facility]    Discharge Summary  Hospitalist    Date of Admission:  7/3/2022  Date of Discharge:  7/7/2022 11:50 AM  Discharging Provider: Elieser Burnham MD, MD  Date of Service (when I saw the patient): 07/07/22    Discharge Diagnoses   Please Refer below     History of Present Illness   Meek Anderson is an 47 year old male who presented with as below     Hospital Course     Meek Anderson is a 47 year old male admitted on 7/3/2022 who was directly admitted due to choledocholithiasis with acute cholecystitis after he  presented to [facility] ED due to generalized weakness.     Final Discharge Diagnosis and Hospital Course      Acute Cholecystitis   Elevated Liver Enzymes  S/p EUS and Lap Alondra   *  Limited abdominal ultrasound was positive for early cholecystitis with noted few tiny gallstones and sludge present, * labs revealed alk phosphatase of 314, ALT of 159, AST of 107, direct bilirubin of 5.3, total bilirubin of 7.3 .  Patient was started on IV Antibiotics, GI and General surgery consulted.     - [facility] consulted- input appreciated. S/p EUS 07/5/22 which was negative for biliary stone.   - general surgery consulted -input appreciated. S/p cholecystectomy 7/6  - started on a diet with plans to advance as tolerated  - continue IV zosyn started on admission,     Patient did well after surgery, his LFT's are trending down, he is pain free, he is passing gas and had BM and tolerating oral diet well. He will be discharge home in stable and improve condition.     DIONY - resolved  -- pre-renal, resolved with IV fluids      Hyponatremia -resolved  -- Likely due to vomiting  --Resolved with IV fluids     Hypokalemia  - resolved  --Likely due to vomiting, now resolved.      Abnormal UA  - urine cultures - NGTD     Possible syncope  -- patient reports losing consciousness while at the cafe along with lightheadedness and sweating  -- likely vasovagal syncope especially in the  setting constant vomiting   -- less likely cardiac and less likely seizures  -- no more symptoms.      Obesity   BMI of 47, need to exercise, diet and loose weight.      HTN  - resume PTA medications.      HLP  - resume PTA atorvastatin      DM2    - resume  PTA metformin 1000 mg BID and weekly subcutaneous Trulicity injections.      Gout  - resume PTA PRN colchicine.       Elieser Burnham MD, MD    Significant Results and Procedures   General Surgery Operative Note     PREOPERATIVE DIAGNOSIS:  Cholecystitis [K81.9]     POSTOPERATIVE DIAGNOSIS: Acute cholecystitis, hydrops gallbladder     PROCEDURE:            Procedure(s):  LAPAROSCOPIC CHOLECYSTECTOMY     Difficulty: Extremely difficult with a 22 modifier owing to acute infection of the gallbladder and morbid obesity, making visualization difficult     ANESTHESIA:  General.     PREOPERATIVE MEDICATIONS: Zosyn IV.     SURGEON:  Cecil Hammonds MD    Pending Results   These results will be followed up by PCP  Unresulted Labs Ordered in the Past 30 Days of this Admission     No orders found from 6/3/2022 to 7/4/2022.          Code Status   Full Code       Primary Care Physician   Physician No Ref-Primary    Physical Exam   Temp: 99  F (37.2  C) Temp src: Oral BP: (!) 169/105 Pulse: 72   Resp: 18 SpO2: 98 % O2 Device: None (Room air)    Vitals:    07/03/22 2200   Weight: (!) 156 kg (344 lb)     Vital Signs with Ranges  Temp:  [98.3  F (36.8  C)-99  F (37.2  C)] 99  F (37.2  C)  Pulse:  [72-76] 72  Resp:  [18] 18  BP: (146-175)/() 169/105  SpO2:  [97 %-98 %] 98 %  I/O last 3 completed shifts:  In: 3726 [P.O.:480; I.V.:3246]  Out: 1500 [Urine:1500]    Constitutional: awake, alert, cooperative, no apparent distress, and appears stated age  Eyes: Lids and lashes normal, pupils equal, round and reactive to light, extra ocular muscles intact, sclera clear, conjunctiva normal  Respiratory: No increased work of breathing, good air exchange, clear to auscultation  bilaterally, no crackles or wheezing  Cardiovascular: Normal apical impulse, regular rate and rhythm, normal S1 and S2, no S3 or S4, and no murmur noted  GI: No scars, normal bowel sounds, soft, non-distended, non-tender, no masses palpated, no hepatosplenomegally  Musculoskeletal: no lower extremity pitting edema present  Neurologic: no focal deficit.     Discharge Disposition   Discharged to home  Condition at discharge: Stable    Consultations This Hospital Stay   SURGERY GENERAL IP CONSULT  GASTROENTEROLOGY IP CONSULT  GASTROENTEROLOGY IP CONSULT    Time Spent on this Encounter   Elieser BADILLO MD, personally saw the patient today and spent greater than 30 minutes discharging this patient.    Discharge Orders      Follow-up and recommended labs and tests     See Surgery Discharge Instruction Sheet.     Reason for your hospital stay    Acute cholecystitis s/p Lap  lisandro     Follow-up and recommended labs and tests     Follow up with primary care provider, Physician No Ref-Primary, within 7 days for hospital follow- up.  The following labs/tests are recommended: cbc, cmp.     Activity    Your activity upon discharge: activity as tolerated     Diet    Follow this diet upon discharge: Orders Placed This Encounter      Moderate Consistent Carb (60 g CHO per Meal) Diet     Discharge Medications   Discharge Medication List as of 7/7/2022 11:05 AM      START taking these medications    Details   HYDROcodone-acetaminophen (NORCO) 5-325 MG tablet Take 1-2 tablets by mouth every 4 hours as needed for moderate to severe pain, Disp-12 tablet, R-0, E-Prescribe      senna-docusate (SENOKOT-S/PERICOLACE) 8.6-50 MG tablet Take 1 tablet by mouth 2 times daily, Disp-15 tablet, R-0, E-Prescribe         CONTINUE these medications which have NOT CHANGED    Details   atorvastatin (LIPITOR) 40 MG tablet Take 40 mg by mouth daily, Historical      colchicine (COLCYRS) 0.6 MG tablet Take 0.6 mg by mouth 3 times daily as needed for  moderate pain, Historical      cyclobenzaprine (FLEXERIL) 5 MG tablet Take 5 mg by mouth 3 times daily as needed for muscle spasms, Historical      dulaglutide (TRULICITY) 1.5 MG/0.5ML pen Inject 1.5 mg Subcutaneous every 7 days, Historical      lisinopril-hydrochlorothiazide (ZESTORETIC) 20-25 MG tablet Take 1 tablet by mouth daily, Historical      metFORMIN (GLUCOPHAGE XR) 500 MG 24 hr tablet Take 1,000 mg by mouth 2 times daily (with meals), Historical      metoprolol succinate ER (TOPROL XL) 25 MG 24 hr tablet Take 25 mg by mouth daily, Historical           Allergies   Allergies   Allergen Reactions     Alcohol      Data   Most Recent 3 CBC's:Recent Labs   Lab Test 07/06/22  1522 07/04/22  0453 07/03/22  1146   WBC  --  5.7 8.2   HGB 13.1* 12.7* 15.3   MCV  --  81 80   PLT  --  190 234      Most Recent 3 BMP's:  Recent Labs   Lab Test 07/06/22  0930 07/06/22  0559 07/05/22  1741 07/05/22  1504 07/05/22  0711 07/04/22  0453 07/03/22  2132   NA  --   --   --   --  139 138 137   POTASSIUM  --   --   --   --  3.6 3.6  3.6 3.1*   CHLORIDE  --   --   --   --  109 106 104   CO2  --   --   --   --  23 24 23   BUN  --   --   --   --  13 18 20   CR  --   --   --   --  0.76 1.05 1.02   ANIONGAP  --   --   --   --  7 8 10   LOIS  --   --   --   --  9.3 8.8 9.1   * 181* 131*   < > 169* 164* 163*    < > = values in this interval not displayed.     Most Recent 2 LFT's:  Recent Labs   Lab Test 07/07/22  0730 07/06/22  1522   AST 23 43   ALT 73* 88*   ALKPHOS 203* 239*   BILITOTAL 1.2 1.3     Most Recent INR's and Anticoagulation Dosing History:  Anticoagulation Dose History     Recent Dosing and Labs Latest Ref Rng & Units 7/3/2022    INR 0.85 - 1.15 1.23(H)        Most Recent 3 Troponin's:No lab results found.  Most Recent Cholesterol Panel:No lab results found.  Most Recent 6 Bacteria Isolates From Any Culture (See EPIC Reports for Culture Details):No lab results found.  Most Recent TSH, T4 and A1c Labs:  Recent Labs    Lab Test 07/04/22  0453   A1C 6.9*     Results for orders placed or performed during the hospital encounter of 07/03/22   US Abdomen Limited    Narrative    EXAM: US ABDOMEN LIMITED  LOCATION: [facility]  DATE/TIME: 7/3/2022 1:50 PM    INDICATION: Right upper quadrant pain with concerns for cholecystitis  COMPARISON: None.  TECHNIQUE: Limited abdominal ultrasound.    FINDINGS:    GALLBLADDER: Distended. There is dependent sludge and few tiny gallstones. There is mild gallbladder wall edema with the wall measuring 4 mm. There is a sonographic Odonnell's sign.    BILE DUCTS: No biliary dilatation. The common duct measures 6 mm.    LIVER: Diffuse fatty infiltration of the liver. No focal mass.    RIGHT KIDNEY: No hydronephrosis.    PANCREAS: Obscured by bowel gas.    No ascites.      Impression    IMPRESSION:  1.  Ultrasound findings of an early cholecystitis.  2.  There are a few tiny gallstones and sludge as well.           Most Recent 3 CBC's:Recent Labs   Lab Test 07/06/22  1522 07/04/22  0453 07/03/22  1146   WBC  --  5.7 8.2   HGB 13.1* 12.7* 15.3   MCV  --  81 80   PLT  --  190 234     Most Recent 3 BMP's:Recent Labs   Lab Test 07/06/22  0930 07/06/22  0559 07/05/22  1741 07/05/22  1504 07/05/22  0711 07/04/22  0453 07/03/22  2132   NA  --   --   --   --  139 138 137   POTASSIUM  --   --   --   --  3.6 3.6  3.6 3.1*   CHLORIDE  --   --   --   --  109 106 104   CO2  --   --   --   --  23 24 23   BUN  --   --   --   --  13 18 20   CR  --   --   --   --  0.76 1.05 1.02   ANIONGAP  --   --   --   --  7 8 10   LOIS  --   --   --   --  9.3 8.8 9.1   * 181* 131*   < > 169* 164* 163*    < > = values in this interval not displayed.     Most Recent 2 LFT's:Recent Labs   Lab Test 07/07/22  0730 07/06/22  1522   AST 23 43   ALT 73* 88*   ALKPHOS 203* 239*   BILITOTAL 1.2 1.3

## 2022-07-07 NOTE — PLAN OF CARE
Goal Outcome Evaluation:    Pt A&O x4. VSS on RA. Pain managed with Narco x2. Denies nausea/vomit. 4 lap sites, with liquid bandage open to air. Voiding regularly in the bathroom. Pt reports BM this shift. On Tele NS, can be bradycardic. Tolerating moderate carb diet. Up independent in the room. Continue to monitor, discharge pending

## 2022-07-07 NOTE — PROGRESS NOTES
Patient discharged at 11:50 AM to Discharged to home  IV was discontinued. Pain at time of discharge was 4/10. Belongings returned to patient.  Discharge instructions and medications reviewed with patient.  Patient verbalized understanding and all questions were answered.  Prescriptions given to patient.  At time of discharge, patient condition was stable and left the unit gen surg escorted by volunteer.

## 2022-07-24 ENCOUNTER — HEALTH MAINTENANCE LETTER (OUTPATIENT)
Age: 47
End: 2022-07-24

## 2022-07-26 ENCOUNTER — TELEPHONE (OUTPATIENT)
Dept: SURGERY | Facility: CLINIC | Age: 47
End: 2022-07-26

## 2022-07-26 NOTE — TELEPHONE ENCOUNTER
[facility]  Post op call note     Meek Anderson was called for an update regarding his recovery.  He underwent a laparoscopic cholecystectomy by Dr. Hammonds on 7/6/22.  Today he tells me he is doing well and denies any complaints.  He states his wounds are healing well.  The pathology revealed acute cholecystitis and cholelithiasis.  This was discussed with the patient.  He was instructed to remove steri strips and gradually resume any strenuous activity or exercise program over the course of a week.  The patient states he understands our discussion and all of his questions were answered.  The patient agrees to follow up in clinic as needed.      Montserrat Alatorre PA-C

## 2022-10-03 ENCOUNTER — HEALTH MAINTENANCE LETTER (OUTPATIENT)
Age: 47
End: 2022-10-03

## 2023-02-12 ENCOUNTER — HEALTH MAINTENANCE LETTER (OUTPATIENT)
Age: 48
End: 2023-02-12

## 2024-03-10 ENCOUNTER — HEALTH MAINTENANCE LETTER (OUTPATIENT)
Age: 49
End: 2024-03-10

## 2025-03-16 ENCOUNTER — HEALTH MAINTENANCE LETTER (OUTPATIENT)
Age: 50
End: 2025-03-16

## 2025-07-15 PROCEDURE — 88304 TISSUE EXAM BY PATHOLOGIST: CPT | Performed by: PATHOLOGY

## 2025-07-18 ENCOUNTER — LAB REQUISITION (OUTPATIENT)
Dept: LAB | Facility: CLINIC | Age: 50
End: 2025-07-18

## 2025-07-22 LAB
PATH REPORT.COMMENTS IMP SPEC: NORMAL
PATH REPORT.COMMENTS IMP SPEC: NORMAL
PATH REPORT.FINAL DX SPEC: NORMAL
PATH REPORT.GROSS SPEC: NORMAL
PATH REPORT.MICROSCOPIC SPEC OTHER STN: NORMAL
PATH REPORT.RELEVANT HX SPEC: NORMAL
PHOTO IMAGE: NORMAL

## 2025-08-04 ENCOUNTER — LAB REQUISITION (OUTPATIENT)
Dept: LAB | Facility: CLINIC | Age: 50
End: 2025-08-04

## 2025-08-04 PROCEDURE — 88304 TISSUE EXAM BY PATHOLOGIST: CPT | Performed by: PATHOLOGY

## 2025-08-07 LAB
PATH REPORT.COMMENTS IMP SPEC: NORMAL
PATH REPORT.COMMENTS IMP SPEC: NORMAL
PATH REPORT.FINAL DX SPEC: NORMAL
PATH REPORT.GROSS SPEC: NORMAL
PATH REPORT.MICROSCOPIC SPEC OTHER STN: NORMAL
PATH REPORT.RELEVANT HX SPEC: NORMAL
PHOTO IMAGE: NORMAL

## (undated) DEVICE — SUCTION IRR STRYKERFLOW II W/TIP 250-070-520

## (undated) DEVICE — SOL WATER IRRIG 1000ML BOTTLE 2F7114

## (undated) DEVICE — ESU GROUND PAD UNIVERSAL W/O CORD

## (undated) DEVICE — ENDO TROCAR FIRST ENTRY KII FIOS Z-THRD 11X100MM CTF33

## (undated) DEVICE — ADH SKIN CLOSURE PREMIERPRO EXOFIN 1.0ML 3470

## (undated) DEVICE — SU VICRYL 4-0 PS-2 18" UND J496H

## (undated) DEVICE — ESU GROUND PAD ADULT W/CORD E7507

## (undated) DEVICE — ENDO BITE BLOCK 60 MAXI LF 00712804

## (undated) DEVICE — DEVICE SUTURE GRASPER TROCAR CLOSURE 14GA PMITCSG

## (undated) DEVICE — SU VICRYL 0 UR-6 27" J603H

## (undated) DEVICE — CLIP APPLIER ENDO 5MM M/L LIGAMAX EL5ML

## (undated) DEVICE — ENDO POUCH UNIV RETRIEVAL SYSTEM INZII 10MM CD001

## (undated) DEVICE — LINEN TOWEL PACK X5 5464

## (undated) DEVICE — GLOVE PROTEXIS W/NEU-THERA 7.5  2D73TE75

## (undated) DEVICE — ESU HOLDER LAP INST DISP PURPLE LONG 330MM H-PRO-330

## (undated) DEVICE — SUCTION CANISTER MEDIVAC LINER 3000ML W/LID 65651-530

## (undated) DEVICE — PACK LAP CHOLE SLC15LCFSD

## (undated) DEVICE — SU VICRYL 3-0 SH 27" J316H

## (undated) DEVICE — RAD RX ISOVUE 300 (50ML) 61% IOPAMIDOL CHARGE PER ML

## (undated) DEVICE — GLOVE PROTEXIS BLUE W/NEU-THERA 7.5  2D73EB75

## (undated) DEVICE — ENDO SCOPE WARMER LF TM500

## (undated) DEVICE — ENDO TROCAR SLEEVE KII Z-THREADED 05X100MM CTS02

## (undated) DEVICE — SOL NACL 0.9% INJ 1000ML BAG 2B1324X

## (undated) DEVICE — ENDO TROCAR FIRST ENTRY KII FIOS Z-THRD 05X100MM CTF03

## (undated) DEVICE — PREP CHLORAPREP 26ML TINTED HI-LITE ORANGE 930815

## (undated) RX ORDER — FENTANYL CITRATE 50 UG/ML
INJECTION, SOLUTION INTRAMUSCULAR; INTRAVENOUS
Status: DISPENSED
Start: 2022-07-05

## (undated) RX ORDER — DEXAMETHASONE SODIUM PHOSPHATE 4 MG/ML
INJECTION, SOLUTION INTRA-ARTICULAR; INTRALESIONAL; INTRAMUSCULAR; INTRAVENOUS; SOFT TISSUE
Status: DISPENSED
Start: 2022-07-06

## (undated) RX ORDER — LABETALOL HYDROCHLORIDE 5 MG/ML
INJECTION, SOLUTION INTRAVENOUS
Status: DISPENSED
Start: 2022-07-06

## (undated) RX ORDER — HYDROMORPHONE HCL IN WATER/PF 6 MG/30 ML
PATIENT CONTROLLED ANALGESIA SYRINGE INTRAVENOUS
Status: DISPENSED
Start: 2022-07-06

## (undated) RX ORDER — LIDOCAINE HYDROCHLORIDE 20 MG/ML
INJECTION, SOLUTION EPIDURAL; INFILTRATION; INTRACAUDAL; PERINEURAL
Status: DISPENSED
Start: 2022-07-06

## (undated) RX ORDER — LABETALOL HYDROCHLORIDE 5 MG/ML
INJECTION, SOLUTION INTRAVENOUS
Status: DISPENSED
Start: 2022-07-05

## (undated) RX ORDER — HYDROMORPHONE HCL IN WATER/PF 6 MG/30 ML
PATIENT CONTROLLED ANALGESIA SYRINGE INTRAVENOUS
Status: DISPENSED
Start: 2022-07-05

## (undated) RX ORDER — GLYCOPYRROLATE 0.2 MG/ML
INJECTION, SOLUTION INTRAMUSCULAR; INTRAVENOUS
Status: DISPENSED
Start: 2022-07-06

## (undated) RX ORDER — METOPROLOL SUCCINATE 25 MG/1
TABLET, EXTENDED RELEASE ORAL
Status: DISPENSED
Start: 2022-07-06

## (undated) RX ORDER — ONDANSETRON 2 MG/ML
INJECTION INTRAMUSCULAR; INTRAVENOUS
Status: DISPENSED
Start: 2022-07-06

## (undated) RX ORDER — BUPIVACAINE HYDROCHLORIDE AND EPINEPHRINE 5; 5 MG/ML; UG/ML
INJECTION, SOLUTION EPIDURAL; INTRACAUDAL; PERINEURAL
Status: DISPENSED
Start: 2022-07-06

## (undated) RX ORDER — PROPOFOL 10 MG/ML
INJECTION, EMULSION INTRAVENOUS
Status: DISPENSED
Start: 2022-07-06

## (undated) RX ORDER — HYDROMORPHONE HYDROCHLORIDE 1 MG/ML
INJECTION, SOLUTION INTRAMUSCULAR; INTRAVENOUS; SUBCUTANEOUS
Status: DISPENSED
Start: 2022-07-06

## (undated) RX ORDER — NEOSTIGMINE METHYLSULFATE 1 MG/ML
VIAL (ML) INJECTION
Status: DISPENSED
Start: 2022-07-06

## (undated) RX ORDER — FENTANYL CITRATE 50 UG/ML
INJECTION, SOLUTION INTRAMUSCULAR; INTRAVENOUS
Status: DISPENSED
Start: 2022-07-06